# Patient Record
Sex: MALE | Race: WHITE | Employment: OTHER | URBAN - METROPOLITAN AREA
[De-identification: names, ages, dates, MRNs, and addresses within clinical notes are randomized per-mention and may not be internally consistent; named-entity substitution may affect disease eponyms.]

---

## 2019-10-31 ENCOUNTER — HOSPITAL ENCOUNTER (OUTPATIENT)
Dept: PREADMISSION TESTING | Age: 54
Discharge: HOME | End: 2019-10-31
Attending: ORTHOPAEDIC SURGERY
Payer: COMMERCIAL

## 2019-10-31 DIAGNOSIS — Z01.818 OTHER SPECIFIED PRE-OPERATIVE EXAMINATION: ICD-10-CM

## 2019-10-31 DIAGNOSIS — M43.09 SPONDYLOLYSIS, MULTIPLE SITES IN SPINE: ICD-10-CM

## 2019-10-31 DIAGNOSIS — M51.16 LUMBAR DISC PROLAPSE WITH ROOT COMPRESSION: ICD-10-CM

## 2019-10-31 DIAGNOSIS — Z01.812 BLOOD TESTS PRIOR TO TREATMENT OR PROCEDURE: ICD-10-CM

## 2019-10-31 LAB
ALBUMIN SERPL-MCNC: 4.3 G/DL (ref 3.4–5)
ALBUMIN/GLOB SERPL: 1.3 {RATIO} (ref 0.8–1.7)
ALP SERPL-CCNC: 87 U/L (ref 45–117)
ALT SERPL-CCNC: 38 U/L (ref 16–61)
ANION GAP SERPL CALC-SCNC: 5 MMOL/L (ref 3–18)
APTT PPP: 36 SEC (ref 23–36.4)
AST SERPL-CCNC: 28 U/L (ref 10–38)
ATRIAL RATE: 57 BPM
BACTERIA SPEC CULT: NORMAL
BASOPHILS # BLD: 0 K/UL (ref 0–0.1)
BASOPHILS NFR BLD: 1 % (ref 0–2)
BILIRUB SERPL-MCNC: 0.5 MG/DL (ref 0.2–1)
BUN SERPL-MCNC: 11 MG/DL (ref 7–18)
BUN/CREAT SERPL: 13 (ref 12–20)
CALCIUM SERPL-MCNC: 9.7 MG/DL (ref 8.5–10.1)
CALCULATED P AXIS, ECG09: 35 DEGREES
CALCULATED R AXIS, ECG10: 49 DEGREES
CALCULATED T AXIS, ECG11: 33 DEGREES
CHLORIDE SERPL-SCNC: 103 MMOL/L (ref 100–111)
CO2 SERPL-SCNC: 31 MMOL/L (ref 21–32)
CREAT SERPL-MCNC: 0.88 MG/DL (ref 0.6–1.3)
DIAGNOSIS, 93000: NORMAL
DIFFERENTIAL METHOD BLD: ABNORMAL
EOSINOPHIL # BLD: 0.4 K/UL (ref 0–0.4)
EOSINOPHIL NFR BLD: 6 % (ref 0–5)
ERYTHROCYTE [DISTWIDTH] IN BLOOD BY AUTOMATED COUNT: 13.7 % (ref 11.6–14.5)
EST. AVERAGE GLUCOSE BLD GHB EST-MCNC: 111 MG/DL
GLOBULIN SER CALC-MCNC: 3.3 G/DL (ref 2–4)
GLUCOSE SERPL-MCNC: 99 MG/DL (ref 74–99)
HBA1C MFR BLD: 5.5 % (ref 4.2–5.6)
HCT VFR BLD AUTO: 43 % (ref 36–48)
HGB BLD-MCNC: 14.6 G/DL (ref 13–16)
INR PPP: 1 (ref 0.8–1.2)
LYMPHOCYTES # BLD: 2.2 K/UL (ref 0.9–3.6)
LYMPHOCYTES NFR BLD: 29 % (ref 21–52)
MCH RBC QN AUTO: 32 PG (ref 24–34)
MCHC RBC AUTO-ENTMCNC: 34 G/DL (ref 31–37)
MCV RBC AUTO: 94.3 FL (ref 74–97)
MONOCYTES # BLD: 0.7 K/UL (ref 0.05–1.2)
MONOCYTES NFR BLD: 9 % (ref 3–10)
NEUTS SEG # BLD: 4.2 K/UL (ref 1.8–8)
NEUTS SEG NFR BLD: 55 % (ref 40–73)
P-R INTERVAL, ECG05: 136 MS
PLATELET # BLD AUTO: 246 K/UL (ref 135–420)
PMV BLD AUTO: 9.5 FL (ref 9.2–11.8)
POTASSIUM SERPL-SCNC: 4.9 MMOL/L (ref 3.5–5.5)
PROT SERPL-MCNC: 7.6 G/DL (ref 6.4–8.2)
PROTHROMBIN TIME: 12.7 SEC (ref 11.5–15.2)
Q-T INTERVAL, ECG07: 388 MS
QRS DURATION, ECG06: 98 MS
QTC CALCULATION (BEZET), ECG08: 377 MS
RBC # BLD AUTO: 4.56 M/UL (ref 4.7–5.5)
SERVICE CMNT-IMP: NORMAL
SODIUM SERPL-SCNC: 139 MMOL/L (ref 136–145)
VENTRICULAR RATE, ECG03: 57 BPM
WBC # BLD AUTO: 7.7 K/UL (ref 4.6–13.2)

## 2019-10-31 PROCEDURE — 86920 COMPATIBILITY TEST SPIN: CPT

## 2019-10-31 PROCEDURE — 87641 MR-STAPH DNA AMP PROBE: CPT

## 2019-10-31 PROCEDURE — 83036 HEMOGLOBIN GLYCOSYLATED A1C: CPT

## 2019-10-31 PROCEDURE — 85610 PROTHROMBIN TIME: CPT

## 2019-10-31 PROCEDURE — 85025 COMPLETE CBC W/AUTO DIFF WBC: CPT

## 2019-10-31 PROCEDURE — 86900 BLOOD TYPING SEROLOGIC ABO: CPT

## 2019-10-31 PROCEDURE — 85730 THROMBOPLASTIN TIME PARTIAL: CPT

## 2019-10-31 PROCEDURE — 80053 COMPREHEN METABOLIC PANEL: CPT

## 2019-10-31 PROCEDURE — 36415 COLL VENOUS BLD VENIPUNCTURE: CPT

## 2019-10-31 PROCEDURE — 93005 ELECTROCARDIOGRAM TRACING: CPT

## 2019-11-06 ENCOUNTER — ANESTHESIA EVENT (OUTPATIENT)
Dept: SURGERY | Age: 54
DRG: 454 | End: 2019-11-06
Payer: COMMERCIAL

## 2019-11-11 ENCOUNTER — HOSPITAL ENCOUNTER (INPATIENT)
Age: 54
LOS: 2 days | Discharge: HOME HEALTH | DRG: 454 | End: 2019-11-13
Attending: ORTHOPAEDIC SURGERY | Admitting: ORTHOPAEDIC SURGERY
Payer: COMMERCIAL

## 2019-11-11 ENCOUNTER — ANESTHESIA (OUTPATIENT)
Dept: SURGERY | Age: 54
DRG: 454 | End: 2019-11-11
Payer: COMMERCIAL

## 2019-11-11 ENCOUNTER — APPOINTMENT (OUTPATIENT)
Dept: GENERAL RADIOLOGY | Age: 54
DRG: 454 | End: 2019-11-11
Attending: INTERNAL MEDICINE
Payer: COMMERCIAL

## 2019-11-11 ENCOUNTER — APPOINTMENT (OUTPATIENT)
Dept: GENERAL RADIOLOGY | Age: 54
DRG: 454 | End: 2019-11-11
Attending: ORTHOPAEDIC SURGERY
Payer: COMMERCIAL

## 2019-11-11 DIAGNOSIS — D62 POSTOPERATIVE ANEMIA DUE TO ACUTE BLOOD LOSS: ICD-10-CM

## 2019-11-11 DIAGNOSIS — M51.36 DDD (DEGENERATIVE DISC DISEASE), LUMBAR: Primary | ICD-10-CM

## 2019-11-11 LAB
ANION GAP SERPL CALC-SCNC: 9 MMOL/L (ref 3–18)
BASOPHILS # BLD: 0 K/UL (ref 0–0.1)
BASOPHILS NFR BLD: 0 % (ref 0–3)
BLD PROD TYP BPU: NORMAL
BLD PROD TYP BPU: NORMAL
BPU ID: NORMAL
BPU ID: NORMAL
BUN SERPL-MCNC: 12 MG/DL (ref 7–18)
BUN/CREAT SERPL: 14 (ref 12–20)
CALCIUM SERPL-MCNC: 8 MG/DL (ref 8.5–10.1)
CALLED TO:,BCALL1: NORMAL
CHLORIDE SERPL-SCNC: 107 MMOL/L (ref 100–111)
CO2 SERPL-SCNC: 24 MMOL/L (ref 21–32)
CREAT SERPL-MCNC: 0.84 MG/DL (ref 0.6–1.3)
DIFFERENTIAL METHOD BLD: ABNORMAL
EOSINOPHIL # BLD: 0 K/UL (ref 0–0.4)
EOSINOPHIL NFR BLD: 0 % (ref 0–5)
ERYTHROCYTE [DISTWIDTH] IN BLOOD BY AUTOMATED COUNT: 13.6 % (ref 11.6–14.5)
GLUCOSE BLD STRIP.AUTO-MCNC: 158 MG/DL (ref 70–110)
GLUCOSE SERPL-MCNC: 180 MG/DL (ref 74–99)
HCT VFR BLD AUTO: 29.6 % (ref 36–48)
HGB BLD-MCNC: 10.1 G/DL (ref 13–16)
LYMPHOCYTES # BLD: 0.5 K/UL (ref 0.8–3.5)
LYMPHOCYTES NFR BLD: 3 % (ref 20–51)
MAGNESIUM SERPL-MCNC: 1.2 MG/DL (ref 1.6–2.6)
MCH RBC QN AUTO: 31.8 PG (ref 24–34)
MCHC RBC AUTO-ENTMCNC: 34.1 G/DL (ref 31–37)
MCV RBC AUTO: 93.1 FL (ref 74–97)
MONOCYTES # BLD: 1.2 K/UL (ref 0–1)
MONOCYTES NFR BLD: 8 % (ref 2–9)
NEUTS BAND NFR BLD MANUAL: 9 % (ref 0–5)
NEUTS SEG # BLD: 12 K/UL (ref 1.8–8)
NEUTS SEG NFR BLD: 80 % (ref 42–75)
PHOSPHATE SERPL-MCNC: 3.2 MG/DL (ref 2.5–4.9)
PLATELET # BLD AUTO: 187 K/UL (ref 135–420)
PMV BLD AUTO: 9.5 FL (ref 9.2–11.8)
POTASSIUM SERPL-SCNC: 3.9 MMOL/L (ref 3.5–5.5)
RBC # BLD AUTO: 3.18 M/UL (ref 4.7–5.5)
RBC MORPH BLD: ABNORMAL
SODIUM SERPL-SCNC: 140 MMOL/L (ref 136–145)
STATUS OF UNIT,%ST: NORMAL
STATUS OF UNIT,%ST: NORMAL
UNIT DIVISION, %UDIV: 0
UNIT DIVISION, %UDIV: 0
WBC # BLD AUTO: 15 K/UL (ref 4.6–13.2)

## 2019-11-11 PROCEDURE — C1713 ANCHOR/SCREW BN/BN,TIS/BN: HCPCS | Performed by: ORTHOPAEDIC SURGERY

## 2019-11-11 PROCEDURE — 77030020268 HC MISC GENERAL SUPPLY: Performed by: ORTHOPAEDIC SURGERY

## 2019-11-11 PROCEDURE — 77030031139 HC SUT VCRL2 J&J -A: Performed by: ORTHOPAEDIC SURGERY

## 2019-11-11 PROCEDURE — 74011000250 HC RX REV CODE- 250: Performed by: INTERNAL MEDICINE

## 2019-11-11 PROCEDURE — 77030040830 HC CATH URETH FOL MDII -A: Performed by: ORTHOPAEDIC SURGERY

## 2019-11-11 PROCEDURE — 77030010512 HC APPL CLP LIG J&J -C: Performed by: ORTHOPAEDIC SURGERY

## 2019-11-11 PROCEDURE — 77030020782 HC GWN BAIR PAWS FLX 3M -B: Performed by: ORTHOPAEDIC SURGERY

## 2019-11-11 PROCEDURE — 77030020010 HC FCPS BPLR DISP INLC -E: Performed by: ORTHOPAEDIC SURGERY

## 2019-11-11 PROCEDURE — P9045 ALBUMIN (HUMAN), 5%, 250 ML: HCPCS | Performed by: NURSE ANESTHETIST, CERTIFIED REGISTERED

## 2019-11-11 PROCEDURE — 77030020262 HC SOL INJ SOD CL 0.9% 100ML: Performed by: ORTHOPAEDIC SURGERY

## 2019-11-11 PROCEDURE — 0Q800ZZ DIVISION OF LUMBAR VERTEBRA, OPEN APPROACH: ICD-10-PCS | Performed by: ORTHOPAEDIC SURGERY

## 2019-11-11 PROCEDURE — 0ST40ZZ RESECTION OF LUMBOSACRAL DISC, OPEN APPROACH: ICD-10-PCS | Performed by: ORTHOPAEDIC SURGERY

## 2019-11-11 PROCEDURE — 74011250636 HC RX REV CODE- 250/636: Performed by: NURSE ANESTHETIST, CERTIFIED REGISTERED

## 2019-11-11 PROCEDURE — 77030018836 HC SOL IRR NACL ICUM -A: Performed by: ORTHOPAEDIC SURGERY

## 2019-11-11 PROCEDURE — 77030020788: Performed by: ORTHOPAEDIC SURGERY

## 2019-11-11 PROCEDURE — 80048 BASIC METABOLIC PNL TOTAL CA: CPT

## 2019-11-11 PROCEDURE — 0SG3071 FUSION OF LUMBOSACRAL JOINT WITH AUTOLOGOUS TISSUE SUBSTITUTE, POSTERIOR APPROACH, POSTERIOR COLUMN, OPEN APPROACH: ICD-10-PCS | Performed by: ORTHOPAEDIC SURGERY

## 2019-11-11 PROCEDURE — 82962 GLUCOSE BLOOD TEST: CPT

## 2019-11-11 PROCEDURE — 0SG30A0 FUSION OF LUMBOSACRAL JOINT WITH INTERBODY FUSION DEVICE, ANTERIOR APPROACH, ANTERIOR COLUMN, OPEN APPROACH: ICD-10-PCS | Performed by: ORTHOPAEDIC SURGERY

## 2019-11-11 PROCEDURE — 74011250636 HC RX REV CODE- 250/636: Performed by: ORTHOPAEDIC SURGERY

## 2019-11-11 PROCEDURE — 4A11X4G MONITORING OF PERIPHERAL NERVOUS ELECTRICAL ACTIVITY, INTRAOPERATIVE, EXTERNAL APPROACH: ICD-10-PCS | Performed by: ORTHOPAEDIC SURGERY

## 2019-11-11 PROCEDURE — 0SG1071 FUSION OF 2 OR MORE LUMBAR VERTEBRAL JOINTS WITH AUTOLOGOUS TISSUE SUBSTITUTE, POSTERIOR APPROACH, POSTERIOR COLUMN, OPEN APPROACH: ICD-10-PCS | Performed by: ORTHOPAEDIC SURGERY

## 2019-11-11 PROCEDURE — 74011250636 HC RX REV CODE- 250/636: Performed by: FAMILY MEDICINE

## 2019-11-11 PROCEDURE — 74019 RADEX ABDOMEN 2 VIEWS: CPT

## 2019-11-11 PROCEDURE — 74011250636 HC RX REV CODE- 250/636: Performed by: SPECIALIST

## 2019-11-11 PROCEDURE — 77030010784 HC BOWL MX BN MEDT -C: Performed by: ORTHOPAEDIC SURGERY

## 2019-11-11 PROCEDURE — 74011250636 HC RX REV CODE- 250/636

## 2019-11-11 PROCEDURE — 77030014007 HC SPNG HEMSTAT J&J -B: Performed by: ORTHOPAEDIC SURGERY

## 2019-11-11 PROCEDURE — 76060000055 HC ANESTHESIA 12 TO 12.5 HR: Performed by: ORTHOPAEDIC SURGERY

## 2019-11-11 PROCEDURE — 65610000006 HC RM INTENSIVE CARE

## 2019-11-11 PROCEDURE — 83735 ASSAY OF MAGNESIUM: CPT

## 2019-11-11 PROCEDURE — 0ST20ZZ RESECTION OF LUMBAR VERTEBRAL DISC, OPEN APPROACH: ICD-10-PCS | Performed by: ORTHOPAEDIC SURGERY

## 2019-11-11 PROCEDURE — 74011000250 HC RX REV CODE- 250: Performed by: ORTHOPAEDIC SURGERY

## 2019-11-11 PROCEDURE — 74011000258 HC RX REV CODE- 258: Performed by: NURSE ANESTHETIST, CERTIFIED REGISTERED

## 2019-11-11 PROCEDURE — 77030034479 HC ADH SKN CLSR PRINEO J&J -B: Performed by: ORTHOPAEDIC SURGERY

## 2019-11-11 PROCEDURE — 77030016661 HC BUR RND1 STRY -C: Performed by: ORTHOPAEDIC SURGERY

## 2019-11-11 PROCEDURE — 74011000250 HC RX REV CODE- 250: Performed by: NURSE ANESTHETIST, CERTIFIED REGISTERED

## 2019-11-11 PROCEDURE — 77030029397 HC SHTH SBS BPLR SEALR MEDT -F: Performed by: ORTHOPAEDIC SURGERY

## 2019-11-11 PROCEDURE — 77030002986 HC SUT PROL J&J -A: Performed by: ORTHOPAEDIC SURGERY

## 2019-11-11 PROCEDURE — 0SG00A0 FUSION OF LUMBAR VERTEBRAL JOINT WITH INTERBODY FUSION DEVICE, ANTERIOR APPROACH, ANTERIOR COLUMN, OPEN APPROACH: ICD-10-PCS | Performed by: ORTHOPAEDIC SURGERY

## 2019-11-11 PROCEDURE — 74011250637 HC RX REV CODE- 250/637: Performed by: SPECIALIST

## 2019-11-11 PROCEDURE — 77030026179: Performed by: ORTHOPAEDIC SURGERY

## 2019-11-11 PROCEDURE — 76010000193: Performed by: ORTHOPAEDIC SURGERY

## 2019-11-11 PROCEDURE — 77030034626 HC LIGASURE SM JAW SEAL OPN SURG COVD -E: Performed by: ORTHOPAEDIC SURGERY

## 2019-11-11 PROCEDURE — 77030026180: Performed by: ORTHOPAEDIC SURGERY

## 2019-11-11 PROCEDURE — 74011636637 HC RX REV CODE- 636/637: Performed by: SPECIALIST

## 2019-11-11 PROCEDURE — 71045 X-RAY EXAM CHEST 1 VIEW: CPT

## 2019-11-11 PROCEDURE — 84100 ASSAY OF PHOSPHORUS: CPT

## 2019-11-11 PROCEDURE — 77030010517 HC APPL SEAL FLOSEL BAXT -B: Performed by: ORTHOPAEDIC SURGERY

## 2019-11-11 PROCEDURE — 01NB0ZZ RELEASE LUMBAR NERVE, OPEN APPROACH: ICD-10-PCS | Performed by: ORTHOPAEDIC SURGERY

## 2019-11-11 PROCEDURE — 0QH304Z INSERTION OF INTERNAL FIXATION DEVICE INTO LEFT PELVIC BONE, OPEN APPROACH: ICD-10-PCS | Performed by: ORTHOPAEDIC SURGERY

## 2019-11-11 PROCEDURE — 85025 COMPLETE CBC W/AUTO DIFF WBC: CPT

## 2019-11-11 PROCEDURE — 77030040361 HC SLV COMPR DVT MDII -B: Performed by: ORTHOPAEDIC SURGERY

## 2019-11-11 PROCEDURE — 74011250636 HC RX REV CODE- 250/636: Performed by: INTERNAL MEDICINE

## 2019-11-11 PROCEDURE — 77030008462 HC STPLR SKN PROX J&J -A: Performed by: ORTHOPAEDIC SURGERY

## 2019-11-11 PROCEDURE — 77030034475 HC MISC IMPL SPN: Performed by: ORTHOPAEDIC SURGERY

## 2019-11-11 PROCEDURE — P9016 RBC LEUKOCYTES REDUCED: HCPCS

## 2019-11-11 PROCEDURE — 94002 VENT MGMT INPAT INIT DAY: CPT

## 2019-11-11 PROCEDURE — 77030022295 HC SEAL BPLR VSL DISP MEDT -F: Performed by: ORTHOPAEDIC SURGERY

## 2019-11-11 PROCEDURE — 0QH204Z INSERTION OF INTERNAL FIXATION DEVICE INTO RIGHT PELVIC BONE, OPEN APPROACH: ICD-10-PCS | Performed by: ORTHOPAEDIC SURGERY

## 2019-11-11 PROCEDURE — 77030004435 HC BUR RND STRY -C: Performed by: ORTHOPAEDIC SURGERY

## 2019-11-11 PROCEDURE — 77030018673: Performed by: ORTHOPAEDIC SURGERY

## 2019-11-11 DEVICE — IC GRAFT CHAMBER - 5 CC
Type: IMPLANTABLE DEVICE | Site: SPINE LUMBAR | Status: FUNCTIONAL
Brand: I/C GRAFT CHAMBER ®

## 2019-11-11 DEVICE — IMPLANTABLE DEVICE: Type: IMPLANTABLE DEVICE | Site: SPINE LUMBAR | Status: FUNCTIONAL

## 2019-11-11 DEVICE — SET SCR SPNL L25MM DIA5.5MM TI FOR 5.5MM ROD EXPEDIUM: Type: IMPLANTABLE DEVICE | Site: SPINE LUMBAR | Status: FUNCTIONAL

## 2019-11-11 DEVICE — IC GRAFT CHAMBER - 10 CC
Type: IMPLANTABLE DEVICE | Site: SPINE LUMBAR | Status: FUNCTIONAL
Brand: I/C GRAFT CHAMBER ®

## 2019-11-11 RX ORDER — ALLOPURINOL 300 MG/1
300 TABLET ORAL DAILY
Status: DISCONTINUED | OUTPATIENT
Start: 2019-11-12 | End: 2019-11-13 | Stop reason: HOSPADM

## 2019-11-11 RX ORDER — INSULIN LISPRO 100 [IU]/ML
INJECTION, SOLUTION INTRAVENOUS; SUBCUTANEOUS ONCE
Status: COMPLETED | OUTPATIENT
Start: 2019-11-11 | End: 2019-11-11

## 2019-11-11 RX ORDER — SODIUM CHLORIDE, SODIUM LACTATE, POTASSIUM CHLORIDE, CALCIUM CHLORIDE 600; 310; 30; 20 MG/100ML; MG/100ML; MG/100ML; MG/100ML
125 INJECTION, SOLUTION INTRAVENOUS CONTINUOUS
Status: DISCONTINUED | OUTPATIENT
Start: 2019-11-11 | End: 2019-11-11

## 2019-11-11 RX ORDER — SODIUM CHLORIDE, SODIUM LACTATE, POTASSIUM CHLORIDE, CALCIUM CHLORIDE 600; 310; 30; 20 MG/100ML; MG/100ML; MG/100ML; MG/100ML
INJECTION, SOLUTION INTRAVENOUS
Status: DISCONTINUED | OUTPATIENT
Start: 2019-11-11 | End: 2019-11-11 | Stop reason: HOSPADM

## 2019-11-11 RX ORDER — ACETAMINOPHEN 500 MG
1000 TABLET ORAL
Status: COMPLETED | OUTPATIENT
Start: 2019-11-11 | End: 2019-11-11

## 2019-11-11 RX ORDER — PANTOPRAZOLE SODIUM 40 MG/1
40 TABLET, DELAYED RELEASE ORAL DAILY
Status: DISCONTINUED | OUTPATIENT
Start: 2019-11-12 | End: 2019-11-13 | Stop reason: HOSPADM

## 2019-11-11 RX ORDER — EPHEDRINE SULFATE/0.9% NACL/PF 50 MG/5 ML
SYRINGE (ML) INTRAVENOUS AS NEEDED
Status: DISCONTINUED | OUTPATIENT
Start: 2019-11-11 | End: 2019-11-11 | Stop reason: HOSPADM

## 2019-11-11 RX ORDER — SUCCINYLCHOLINE CHLORIDE 100 MG/5ML
SYRINGE (ML) INTRAVENOUS AS NEEDED
Status: DISCONTINUED | OUTPATIENT
Start: 2019-11-11 | End: 2019-11-11 | Stop reason: HOSPADM

## 2019-11-11 RX ORDER — ROCURONIUM BROMIDE 10 MG/ML
INJECTION, SOLUTION INTRAVENOUS AS NEEDED
Status: DISCONTINUED | OUTPATIENT
Start: 2019-11-11 | End: 2019-11-11 | Stop reason: HOSPADM

## 2019-11-11 RX ORDER — IPRATROPIUM BROMIDE AND ALBUTEROL SULFATE 2.5; .5 MG/3ML; MG/3ML
3 SOLUTION RESPIRATORY (INHALATION)
Status: DISCONTINUED | OUTPATIENT
Start: 2019-11-11 | End: 2019-11-13 | Stop reason: HOSPADM

## 2019-11-11 RX ORDER — FENTANYL CITRATE 50 UG/ML
INJECTION, SOLUTION INTRAMUSCULAR; INTRAVENOUS AS NEEDED
Status: DISCONTINUED | OUTPATIENT
Start: 2019-11-11 | End: 2019-11-11 | Stop reason: HOSPADM

## 2019-11-11 RX ORDER — KETAMINE HYDROCHLORIDE 10 MG/ML
INJECTION, SOLUTION INTRAMUSCULAR; INTRAVENOUS AS NEEDED
Status: DISCONTINUED | OUTPATIENT
Start: 2019-11-11 | End: 2019-11-11 | Stop reason: HOSPADM

## 2019-11-11 RX ORDER — LANOLIN ALCOHOL/MO/W.PET/CERES
325 CREAM (GRAM) TOPICAL
Status: DISCONTINUED | OUTPATIENT
Start: 2019-11-12 | End: 2019-11-13 | Stop reason: HOSPADM

## 2019-11-11 RX ORDER — DEXAMETHASONE SODIUM PHOSPHATE 4 MG/ML
4 INJECTION, SOLUTION INTRA-ARTICULAR; INTRALESIONAL; INTRAMUSCULAR; INTRAVENOUS; SOFT TISSUE EVERY 8 HOURS
Status: DISCONTINUED | OUTPATIENT
Start: 2019-11-11 | End: 2019-11-13 | Stop reason: HOSPADM

## 2019-11-11 RX ORDER — ONDANSETRON 2 MG/ML
4 INJECTION INTRAMUSCULAR; INTRAVENOUS
Status: DISCONTINUED | OUTPATIENT
Start: 2019-11-11 | End: 2019-11-13 | Stop reason: HOSPADM

## 2019-11-11 RX ORDER — SODIUM CHLORIDE 9 MG/ML
250 INJECTION, SOLUTION INTRAVENOUS AS NEEDED
Status: DISCONTINUED | OUTPATIENT
Start: 2019-11-11 | End: 2019-11-13 | Stop reason: HOSPADM

## 2019-11-11 RX ORDER — PROPOFOL 10 MG/ML
INJECTION, EMULSION INTRAVENOUS
Status: DISCONTINUED | OUTPATIENT
Start: 2019-11-11 | End: 2019-11-11 | Stop reason: HOSPADM

## 2019-11-11 RX ORDER — CHLORHEXIDINE GLUCONATE 1.2 MG/ML
10 RINSE ORAL EVERY 12 HOURS
Status: DISCONTINUED | OUTPATIENT
Start: 2019-11-11 | End: 2019-11-12

## 2019-11-11 RX ORDER — ALBUMIN HUMAN 50 G/1000ML
SOLUTION INTRAVENOUS AS NEEDED
Status: DISCONTINUED | OUTPATIENT
Start: 2019-11-11 | End: 2019-11-11 | Stop reason: HOSPADM

## 2019-11-11 RX ORDER — METOPROLOL SUCCINATE 50 MG/1
50 TABLET, EXTENDED RELEASE ORAL DAILY
Status: DISCONTINUED | OUTPATIENT
Start: 2019-11-12 | End: 2019-11-13

## 2019-11-11 RX ORDER — CEFAZOLIN SODIUM/WATER 2 G/20 ML
2 SYRINGE (ML) INTRAVENOUS EVERY 8 HOURS
Status: DISPENSED | OUTPATIENT
Start: 2019-11-12 | End: 2019-11-12

## 2019-11-11 RX ORDER — PHENYLEPHRINE HCL IN 0.9% NACL 1 MG/10 ML
SYRINGE (ML) INTRAVENOUS AS NEEDED
Status: DISCONTINUED | OUTPATIENT
Start: 2019-11-11 | End: 2019-11-11 | Stop reason: HOSPADM

## 2019-11-11 RX ORDER — FENTANYL CITRATE 50 UG/ML
25 INJECTION, SOLUTION INTRAMUSCULAR; INTRAVENOUS AS NEEDED
Status: DISCONTINUED | OUTPATIENT
Start: 2019-11-11 | End: 2019-11-13 | Stop reason: HOSPADM

## 2019-11-11 RX ORDER — DIPHENHYDRAMINE HCL 25 MG
25 CAPSULE ORAL
Status: DISCONTINUED | OUTPATIENT
Start: 2019-11-11 | End: 2019-11-13 | Stop reason: HOSPADM

## 2019-11-11 RX ORDER — SODIUM CHLORIDE 0.9 % (FLUSH) 0.9 %
5-40 SYRINGE (ML) INJECTION EVERY 8 HOURS
Status: DISCONTINUED | OUTPATIENT
Start: 2019-11-11 | End: 2019-11-11

## 2019-11-11 RX ORDER — FENTANYL CITRATE 50 UG/ML
50-100 INJECTION, SOLUTION INTRAMUSCULAR; INTRAVENOUS
Status: DISCONTINUED | OUTPATIENT
Start: 2019-11-11 | End: 2019-11-13 | Stop reason: HOSPADM

## 2019-11-11 RX ORDER — PREGABALIN 50 MG/1
50 CAPSULE ORAL
Status: COMPLETED | OUTPATIENT
Start: 2019-11-11 | End: 2019-11-11

## 2019-11-11 RX ORDER — MIDAZOLAM HYDROCHLORIDE 1 MG/ML
INJECTION, SOLUTION INTRAMUSCULAR; INTRAVENOUS
Status: COMPLETED
Start: 2019-11-11 | End: 2019-11-11

## 2019-11-11 RX ORDER — MIDAZOLAM HYDROCHLORIDE 1 MG/ML
2 INJECTION, SOLUTION INTRAMUSCULAR; INTRAVENOUS ONCE
Status: COMPLETED | OUTPATIENT
Start: 2019-11-11 | End: 2019-11-11

## 2019-11-11 RX ORDER — HYDROMORPHONE HYDROCHLORIDE 2 MG/ML
INJECTION, SOLUTION INTRAMUSCULAR; INTRAVENOUS; SUBCUTANEOUS AS NEEDED
Status: DISCONTINUED | OUTPATIENT
Start: 2019-11-11 | End: 2019-11-11 | Stop reason: HOSPADM

## 2019-11-11 RX ORDER — SODIUM CHLORIDE 0.9 % (FLUSH) 0.9 %
5-40 SYRINGE (ML) INJECTION EVERY 8 HOURS
Status: DISCONTINUED | OUTPATIENT
Start: 2019-11-11 | End: 2019-11-13 | Stop reason: HOSPADM

## 2019-11-11 RX ORDER — VANCOMYCIN HYDROCHLORIDE 1 G/20ML
INJECTION, POWDER, LYOPHILIZED, FOR SOLUTION INTRAVENOUS AS NEEDED
Status: DISCONTINUED | OUTPATIENT
Start: 2019-11-11 | End: 2019-11-11 | Stop reason: HOSPADM

## 2019-11-11 RX ORDER — DEXAMETHASONE SODIUM PHOSPHATE 4 MG/ML
INJECTION, SOLUTION INTRA-ARTICULAR; INTRALESIONAL; INTRAMUSCULAR; INTRAVENOUS; SOFT TISSUE AS NEEDED
Status: DISCONTINUED | OUTPATIENT
Start: 2019-11-11 | End: 2019-11-11 | Stop reason: HOSPADM

## 2019-11-11 RX ORDER — NALOXONE HYDROCHLORIDE 0.4 MG/ML
0.4 INJECTION, SOLUTION INTRAMUSCULAR; INTRAVENOUS; SUBCUTANEOUS AS NEEDED
Status: DISCONTINUED | OUTPATIENT
Start: 2019-11-11 | End: 2019-11-13 | Stop reason: HOSPADM

## 2019-11-11 RX ORDER — SODIUM CHLORIDE 0.9 % (FLUSH) 0.9 %
5-40 SYRINGE (ML) INJECTION AS NEEDED
Status: DISCONTINUED | OUTPATIENT
Start: 2019-11-11 | End: 2019-11-13 | Stop reason: HOSPADM

## 2019-11-11 RX ORDER — MAGNESIUM SULFATE 100 %
4 CRYSTALS MISCELLANEOUS AS NEEDED
Status: DISCONTINUED | OUTPATIENT
Start: 2019-11-11 | End: 2019-11-13 | Stop reason: HOSPADM

## 2019-11-11 RX ORDER — NALOXONE HYDROCHLORIDE 0.4 MG/ML
0.2 INJECTION, SOLUTION INTRAMUSCULAR; INTRAVENOUS; SUBCUTANEOUS AS NEEDED
Status: DISCONTINUED | OUTPATIENT
Start: 2019-11-11 | End: 2019-11-13 | Stop reason: HOSPADM

## 2019-11-11 RX ORDER — FENTANYL CITRATE 50 UG/ML
25 INJECTION, SOLUTION INTRAMUSCULAR; INTRAVENOUS
Status: DISCONTINUED | OUTPATIENT
Start: 2019-11-11 | End: 2019-11-11

## 2019-11-11 RX ORDER — SODIUM CHLORIDE, SODIUM LACTATE, POTASSIUM CHLORIDE, CALCIUM CHLORIDE 600; 310; 30; 20 MG/100ML; MG/100ML; MG/100ML; MG/100ML
100 INJECTION, SOLUTION INTRAVENOUS CONTINUOUS
Status: DISCONTINUED | OUTPATIENT
Start: 2019-11-11 | End: 2019-11-13 | Stop reason: HOSPADM

## 2019-11-11 RX ORDER — GLYCOPYRROLATE 0.2 MG/ML
INJECTION INTRAMUSCULAR; INTRAVENOUS AS NEEDED
Status: DISCONTINUED | OUTPATIENT
Start: 2019-11-11 | End: 2019-11-11 | Stop reason: HOSPADM

## 2019-11-11 RX ORDER — ASPIRIN 325 MG
325 TABLET ORAL DAILY
Status: DISCONTINUED | OUTPATIENT
Start: 2019-11-12 | End: 2019-11-13 | Stop reason: HOSPADM

## 2019-11-11 RX ORDER — CYCLOBENZAPRINE HCL 10 MG
5 TABLET ORAL
Status: DISCONTINUED | OUTPATIENT
Start: 2019-11-11 | End: 2019-11-13 | Stop reason: HOSPADM

## 2019-11-11 RX ORDER — MAGNESIUM SULFATE HEPTAHYDRATE 40 MG/ML
2 INJECTION, SOLUTION INTRAVENOUS
Status: COMPLETED | OUTPATIENT
Start: 2019-11-12 | End: 2019-11-12

## 2019-11-11 RX ORDER — ASCORBIC ACID 250 MG
1000 TABLET ORAL DAILY
Status: DISCONTINUED | OUTPATIENT
Start: 2019-11-12 | End: 2019-11-13 | Stop reason: HOSPADM

## 2019-11-11 RX ORDER — DOCUSATE SODIUM 100 MG/1
100 CAPSULE, LIQUID FILLED ORAL 2 TIMES DAILY
Status: DISCONTINUED | OUTPATIENT
Start: 2019-11-11 | End: 2019-11-13 | Stop reason: HOSPADM

## 2019-11-11 RX ORDER — ATORVASTATIN CALCIUM 20 MG/1
80 TABLET, FILM COATED ORAL DAILY
Status: DISCONTINUED | OUTPATIENT
Start: 2019-11-12 | End: 2019-11-13 | Stop reason: HOSPADM

## 2019-11-11 RX ORDER — PROPOFOL 10 MG/ML
INJECTION, EMULSION INTRAVENOUS AS NEEDED
Status: DISCONTINUED | OUTPATIENT
Start: 2019-11-11 | End: 2019-11-11 | Stop reason: HOSPADM

## 2019-11-11 RX ORDER — OXYCODONE HYDROCHLORIDE 5 MG/1
5 TABLET ORAL EVERY 4 HOURS
Status: DISCONTINUED | OUTPATIENT
Start: 2019-11-11 | End: 2019-11-13 | Stop reason: HOSPADM

## 2019-11-11 RX ORDER — MIDAZOLAM HYDROCHLORIDE 1 MG/ML
INJECTION, SOLUTION INTRAMUSCULAR; INTRAVENOUS AS NEEDED
Status: DISCONTINUED | OUTPATIENT
Start: 2019-11-11 | End: 2019-11-11 | Stop reason: HOSPADM

## 2019-11-11 RX ORDER — ACETAMINOPHEN 325 MG/1
650 TABLET ORAL
Status: DISCONTINUED | OUTPATIENT
Start: 2019-11-11 | End: 2019-11-13 | Stop reason: HOSPADM

## 2019-11-11 RX ORDER — CEFAZOLIN SODIUM/WATER 2 G/20 ML
2 SYRINGE (ML) INTRAVENOUS ONCE
Status: COMPLETED | OUTPATIENT
Start: 2019-11-11 | End: 2019-11-11

## 2019-11-11 RX ORDER — DEXTROSE MONOHYDRATE 100 MG/ML
125-250 INJECTION, SOLUTION INTRAVENOUS AS NEEDED
Status: DISCONTINUED | OUTPATIENT
Start: 2019-11-11 | End: 2019-11-13 | Stop reason: HOSPADM

## 2019-11-11 RX ORDER — VENLAFAXINE HYDROCHLORIDE 75 MG/1
150 CAPSULE, EXTENDED RELEASE ORAL DAILY
Status: DISCONTINUED | OUTPATIENT
Start: 2019-11-12 | End: 2019-11-13 | Stop reason: HOSPADM

## 2019-11-11 RX ORDER — DEXAMETHASONE 4 MG/1
4 TABLET ORAL EVERY 8 HOURS
Status: DISPENSED | OUTPATIENT
Start: 2019-11-11 | End: 2019-11-12

## 2019-11-11 RX ORDER — SODIUM CHLORIDE 9 MG/ML
INJECTION, SOLUTION INTRAVENOUS
Status: DISCONTINUED | OUTPATIENT
Start: 2019-11-11 | End: 2019-11-11 | Stop reason: HOSPADM

## 2019-11-11 RX ORDER — MORPHINE SULFATE 2 MG/ML
2 INJECTION, SOLUTION INTRAMUSCULAR; INTRAVENOUS
Status: DISCONTINUED | OUTPATIENT
Start: 2019-11-11 | End: 2019-11-13 | Stop reason: HOSPADM

## 2019-11-11 RX ORDER — ONDANSETRON 2 MG/ML
INJECTION INTRAMUSCULAR; INTRAVENOUS AS NEEDED
Status: DISCONTINUED | OUTPATIENT
Start: 2019-11-11 | End: 2019-11-11 | Stop reason: HOSPADM

## 2019-11-11 RX ORDER — LIDOCAINE HYDROCHLORIDE 20 MG/ML
INJECTION, SOLUTION EPIDURAL; INFILTRATION; INTRACAUDAL; PERINEURAL AS NEEDED
Status: DISCONTINUED | OUTPATIENT
Start: 2019-11-11 | End: 2019-11-11 | Stop reason: HOSPADM

## 2019-11-11 RX ADMIN — ACETAMINOPHEN 1000 MG: 500 TABLET ORAL at 06:46

## 2019-11-11 RX ADMIN — Medication 10 MG: at 12:08

## 2019-11-11 RX ADMIN — Medication 10 MG: at 12:42

## 2019-11-11 RX ADMIN — Medication 2 G: at 23:21

## 2019-11-11 RX ADMIN — SODIUM CHLORIDE, SODIUM LACTATE, POTASSIUM CHLORIDE, AND CALCIUM CHLORIDE: 600; 310; 30; 20 INJECTION, SOLUTION INTRAVENOUS at 11:43

## 2019-11-11 RX ADMIN — Medication 100 MCG: at 14:57

## 2019-11-11 RX ADMIN — SODIUM CHLORIDE, SODIUM LACTATE, POTASSIUM CHLORIDE, AND CALCIUM CHLORIDE: 600; 310; 30; 20 INJECTION, SOLUTION INTRAVENOUS at 13:38

## 2019-11-11 RX ADMIN — Medication 2 G: at 08:32

## 2019-11-11 RX ADMIN — Medication 10 MG: at 12:14

## 2019-11-11 RX ADMIN — Medication 10 MG: at 13:51

## 2019-11-11 RX ADMIN — REMIFENTANIL HYDROCHLORIDE 0.05 MCG/KG/MIN: 1 INJECTION, POWDER, LYOPHILIZED, FOR SOLUTION INTRAVENOUS at 08:26

## 2019-11-11 RX ADMIN — PHENYLEPHRINE HYDROCHLORIDE 100 MCG/MIN: 10 INJECTION INTRAVENOUS at 17:16

## 2019-11-11 RX ADMIN — TRANEXAMIC ACID 1 G: 100 INJECTION, SOLUTION INTRAVENOUS at 08:19

## 2019-11-11 RX ADMIN — Medication 10 MG: at 08:20

## 2019-11-11 RX ADMIN — Medication 10 MG: at 13:56

## 2019-11-11 RX ADMIN — Medication 2 G: at 12:27

## 2019-11-11 RX ADMIN — KETAMINE HYDROCHLORIDE 10 MG: 10 INJECTION, SOLUTION INTRAMUSCULAR; INTRAVENOUS at 19:25

## 2019-11-11 RX ADMIN — FENTANYL CITRATE 100 MCG: 50 INJECTION, SOLUTION INTRAMUSCULAR; INTRAVENOUS at 07:55

## 2019-11-11 RX ADMIN — PROPOFOL 50 MG: 10 INJECTION, EMULSION INTRAVENOUS at 07:59

## 2019-11-11 RX ADMIN — Medication 100 MCG: at 14:27

## 2019-11-11 RX ADMIN — Medication 10 MG: at 12:23

## 2019-11-11 RX ADMIN — SODIUM CHLORIDE, SODIUM LACTATE, POTASSIUM CHLORIDE, AND CALCIUM CHLORIDE: 600; 310; 30; 20 INJECTION, SOLUTION INTRAVENOUS at 15:15

## 2019-11-11 RX ADMIN — PROPOFOL 150 MG: 10 INJECTION, EMULSION INTRAVENOUS at 07:58

## 2019-11-11 RX ADMIN — ONDANSETRON HYDROCHLORIDE 4 MG: 2 INJECTION INTRAMUSCULAR; INTRAVENOUS at 08:27

## 2019-11-11 RX ADMIN — MIDAZOLAM HYDROCHLORIDE 2 MG: 1 INJECTION, SOLUTION INTRAMUSCULAR; INTRAVENOUS at 21:08

## 2019-11-11 RX ADMIN — Medication 10 ML: at 21:49

## 2019-11-11 RX ADMIN — Medication 10 MG: at 07:58

## 2019-11-11 RX ADMIN — TRANEXAMIC ACID 1 G: 100 INJECTION, SOLUTION INTRAVENOUS at 16:18

## 2019-11-11 RX ADMIN — REMIFENTANIL HYDROCHLORIDE: 1 INJECTION, POWDER, LYOPHILIZED, FOR SOLUTION INTRAVENOUS at 16:23

## 2019-11-11 RX ADMIN — LIDOCAINE HYDROCHLORIDE 60 MG: 20 INJECTION, SOLUTION EPIDURAL; INFILTRATION; INTRACAUDAL; PERINEURAL at 07:58

## 2019-11-11 RX ADMIN — ALBUMIN (HUMAN) 250 ML: 12.5 INJECTION, SOLUTION INTRAVENOUS at 17:22

## 2019-11-11 RX ADMIN — Medication 10 MG: at 10:13

## 2019-11-11 RX ADMIN — Medication 10 MG: at 10:29

## 2019-11-11 RX ADMIN — KETAMINE HYDROCHLORIDE 10 MG: 10 INJECTION, SOLUTION INTRAMUSCULAR; INTRAVENOUS at 08:03

## 2019-11-11 RX ADMIN — Medication 100 MCG: at 13:56

## 2019-11-11 RX ADMIN — KETAMINE HYDROCHLORIDE 10 MG: 10 INJECTION, SOLUTION INTRAMUSCULAR; INTRAVENOUS at 12:49

## 2019-11-11 RX ADMIN — KETAMINE HYDROCHLORIDE 5 MG: 10 INJECTION, SOLUTION INTRAMUSCULAR; INTRAVENOUS at 18:44

## 2019-11-11 RX ADMIN — Medication 20 MG: at 09:10

## 2019-11-11 RX ADMIN — PROPOFOL: 10 INJECTION, EMULSION INTRAVENOUS at 16:23

## 2019-11-11 RX ADMIN — Medication 100 MCG: at 13:47

## 2019-11-11 RX ADMIN — ALBUMIN (HUMAN) 249 ML: 12.5 INJECTION, SOLUTION INTRAVENOUS at 12:44

## 2019-11-11 RX ADMIN — Medication 50 MCG/HR: at 22:08

## 2019-11-11 RX ADMIN — SODIUM CHLORIDE: 900 INJECTION, SOLUTION INTRAVENOUS at 17:20

## 2019-11-11 RX ADMIN — KETAMINE HYDROCHLORIDE 10 MG: 10 INJECTION, SOLUTION INTRAMUSCULAR; INTRAVENOUS at 19:13

## 2019-11-11 RX ADMIN — INSULIN LISPRO 3 UNITS: 100 INJECTION, SOLUTION INTRAVENOUS; SUBCUTANEOUS at 21:50

## 2019-11-11 RX ADMIN — Medication 10 MG: at 09:37

## 2019-11-11 RX ADMIN — Medication 100 MCG: at 14:48

## 2019-11-11 RX ADMIN — Medication 10 MG: at 10:34

## 2019-11-11 RX ADMIN — Medication 100 MCG: at 14:59

## 2019-11-11 RX ADMIN — SODIUM CHLORIDE, SODIUM LACTATE, POTASSIUM CHLORIDE, AND CALCIUM CHLORIDE 100 ML/HR: 600; 310; 30; 20 INJECTION, SOLUTION INTRAVENOUS at 21:39

## 2019-11-11 RX ADMIN — HYDROMORPHONE HYDROCHLORIDE 0.2 MG: 2 INJECTION, SOLUTION INTRAMUSCULAR; INTRAVENOUS; SUBCUTANEOUS at 19:16

## 2019-11-11 RX ADMIN — SODIUM CHLORIDE, SODIUM LACTATE, POTASSIUM CHLORIDE, AND CALCIUM CHLORIDE: 600; 310; 30; 20 INJECTION, SOLUTION INTRAVENOUS at 12:58

## 2019-11-11 RX ADMIN — MIDAZOLAM 1 MG: 1 INJECTION INTRAMUSCULAR; INTRAVENOUS at 12:51

## 2019-11-11 RX ADMIN — DEXAMETHASONE SODIUM PHOSPHATE 8 MG: 4 INJECTION, SOLUTION INTRAMUSCULAR; INTRAVENOUS at 07:53

## 2019-11-11 RX ADMIN — Medication 100 MCG: at 18:41

## 2019-11-11 RX ADMIN — MIDAZOLAM 1 MG: 1 INJECTION INTRAMUSCULAR; INTRAVENOUS at 14:23

## 2019-11-11 RX ADMIN — KETAMINE HYDROCHLORIDE 5 MG: 10 INJECTION, SOLUTION INTRAMUSCULAR; INTRAVENOUS at 18:27

## 2019-11-11 RX ADMIN — SODIUM CHLORIDE, SODIUM LACTATE, POTASSIUM CHLORIDE, AND CALCIUM CHLORIDE: 600; 310; 30; 20 INJECTION, SOLUTION INTRAVENOUS at 09:43

## 2019-11-11 RX ADMIN — Medication 5 MG: at 09:35

## 2019-11-11 RX ADMIN — Medication 100 MCG: at 19:35

## 2019-11-11 RX ADMIN — Medication 10 MG: at 12:05

## 2019-11-11 RX ADMIN — MIDAZOLAM 1 MG: 1 INJECTION INTRAMUSCULAR; INTRAVENOUS at 11:51

## 2019-11-11 RX ADMIN — CHLORHEXIDINE GLUCONATE 10 ML: 1.2 RINSE ORAL at 21:39

## 2019-11-11 RX ADMIN — MIDAZOLAM 2 MG: 1 INJECTION INTRAMUSCULAR; INTRAVENOUS at 07:50

## 2019-11-11 RX ADMIN — Medication 2 G: at 16:06

## 2019-11-11 RX ADMIN — Medication 10 MG: at 10:15

## 2019-11-11 RX ADMIN — REMIFENTANIL HYDROCHLORIDE 0.05 MCG: 1 INJECTION, POWDER, LYOPHILIZED, FOR SOLUTION INTRAVENOUS at 12:04

## 2019-11-11 RX ADMIN — SODIUM CHLORIDE, SODIUM LACTATE, POTASSIUM CHLORIDE, AND CALCIUM CHLORIDE: 600; 310; 30; 20 INJECTION, SOLUTION INTRAVENOUS at 08:11

## 2019-11-11 RX ADMIN — Medication 100 MG: at 07:59

## 2019-11-11 RX ADMIN — Medication 100 MCG: at 14:33

## 2019-11-11 RX ADMIN — PROPOFOL 100 MCG/KG/MIN: 10 INJECTION, EMULSION INTRAVENOUS at 08:36

## 2019-11-11 RX ADMIN — PREGABALIN 50 MG: 50 CAPSULE ORAL at 06:46

## 2019-11-11 RX ADMIN — MIDAZOLAM 1 MG: 1 INJECTION INTRAMUSCULAR; INTRAVENOUS at 15:05

## 2019-11-11 RX ADMIN — ALBUMIN (HUMAN) 1 ML: 12.5 INJECTION, SOLUTION INTRAVENOUS at 12:22

## 2019-11-11 RX ADMIN — MIDAZOLAM 1 MG: 1 INJECTION INTRAMUSCULAR; INTRAVENOUS at 15:30

## 2019-11-11 RX ADMIN — Medication 100 MCG: at 19:30

## 2019-11-11 RX ADMIN — SODIUM CHLORIDE, SODIUM LACTATE, POTASSIUM CHLORIDE, AND CALCIUM CHLORIDE 125 ML/HR: 600; 310; 30; 20 INJECTION, SOLUTION INTRAVENOUS at 06:26

## 2019-11-11 RX ADMIN — Medication 10 MG: at 10:48

## 2019-11-11 RX ADMIN — SODIUM CHLORIDE: 900 INJECTION, SOLUTION INTRAVENOUS at 16:02

## 2019-11-11 RX ADMIN — Medication 15 MG: at 09:39

## 2019-11-11 RX ADMIN — Medication 10 MG: at 13:52

## 2019-11-11 RX ADMIN — DEXAMETHASONE SODIUM PHOSPHATE 4 MG: 4 INJECTION, SOLUTION INTRAMUSCULAR; INTRAVENOUS at 21:40

## 2019-11-11 RX ADMIN — MIDAZOLAM 1 MG: 1 INJECTION INTRAMUSCULAR; INTRAVENOUS at 18:18

## 2019-11-11 RX ADMIN — Medication 10 MG: at 12:12

## 2019-11-11 RX ADMIN — Medication 10 MG: at 13:46

## 2019-11-11 RX ADMIN — MIDAZOLAM 1 MG: 1 INJECTION INTRAMUSCULAR; INTRAVENOUS at 17:22

## 2019-11-11 RX ADMIN — Medication 100 MCG: at 17:10

## 2019-11-11 RX ADMIN — Medication 10 MG: at 08:31

## 2019-11-11 RX ADMIN — Medication 5 MG: at 09:26

## 2019-11-11 RX ADMIN — PHENYLEPHRINE HYDROCHLORIDE 100 MCG: 10 INJECTION INTRAVENOUS at 17:20

## 2019-11-11 RX ADMIN — ONDANSETRON HYDROCHLORIDE 4 MG: 2 INJECTION INTRAMUSCULAR; INTRAVENOUS at 19:51

## 2019-11-11 RX ADMIN — MIDAZOLAM 1 MG: 1 INJECTION INTRAMUSCULAR; INTRAVENOUS at 10:43

## 2019-11-11 RX ADMIN — Medication 100 MCG: at 17:14

## 2019-11-11 RX ADMIN — MAGNESIUM SULFATE HEPTAHYDRATE 2 G: 40 INJECTION, SOLUTION INTRAVENOUS at 23:21

## 2019-11-11 RX ADMIN — MIDAZOLAM 1 MG: 1 INJECTION INTRAMUSCULAR; INTRAVENOUS at 08:07

## 2019-11-11 RX ADMIN — GLYCOPYRROLATE 0.2 MG: 0.2 INJECTION INTRAMUSCULAR; INTRAVENOUS at 07:50

## 2019-11-11 RX ADMIN — SUGAMMADEX 155 MG: 100 INJECTION, SOLUTION INTRAVENOUS at 09:48

## 2019-11-11 RX ADMIN — Medication 10 MG: at 13:49

## 2019-11-11 RX ADMIN — Medication 40 MG: at 08:10

## 2019-11-11 RX ADMIN — Medication 100 MCG: at 17:18

## 2019-11-11 RX ADMIN — Medication 5 MG: at 09:32

## 2019-11-11 RX ADMIN — Medication 100 MCG: at 14:38

## 2019-11-11 RX ADMIN — MIDAZOLAM HYDROCHLORIDE 2 MG/HR: 5 INJECTION, SOLUTION INTRAMUSCULAR; INTRAVENOUS at 21:28

## 2019-11-11 RX ADMIN — Medication 10 MG: at 12:38

## 2019-11-11 RX ADMIN — FENTANYL CITRATE 100 MCG: 50 INJECTION INTRAMUSCULAR; INTRAVENOUS at 21:37

## 2019-11-11 RX ADMIN — SODIUM CHLORIDE, SODIUM LACTATE, POTASSIUM CHLORIDE, AND CALCIUM CHLORIDE: 600; 310; 30; 20 INJECTION, SOLUTION INTRAVENOUS at 18:42

## 2019-11-11 NOTE — PROGRESS NOTES
History and Physical reviewed; I have examined the patient and there are no pertinent changes.     Burgess Nayana MD   7:23 AM 11/11/2019

## 2019-11-11 NOTE — ANESTHESIA PROCEDURE NOTES
Arterial Line Placement    Performed by: Areli Brown MD  Authorized by: Areli Brown MD     Pre-Procedure  Indications:  Arterial pressure monitoring and blood sampling  Preanesthetic Checklist: patient identified, site marked, patient being monitored and patient being monitored      Procedure:   Prep:  Chlorhexidine and alcohol  Seldinger Technique?: Yes    Orientation:  Left  Location:  Radial artery  Catheter size: 21ga needle, wire, 20 ga catheter (cook)  Number of attempts:  1  Cont Cardiac Output Sensor: No      Assessment:   Post-procedure:  Line secured and sterile dressing applied  Patient Tolerance:  Patient tolerated the procedure well with no immediate complications  Comment:   Done under GA. Ultrasound guidance to place - out of plane, then in  Plane for photo of artery.

## 2019-11-11 NOTE — ANESTHESIA PREPROCEDURE EVALUATION
Relevant Problems   No relevant active problems       Anesthetic History   No history of anesthetic complications            Review of Systems / Medical History  Patient summary reviewed, nursing notes reviewed and pertinent labs reviewed    Pulmonary  Within defined limits                 Neuro/Psych       CVA: no residual symptoms  TIA and psychiatric history     Cardiovascular    Hypertension: well controlled                   GI/Hepatic/Renal     GERD: well controlled           Endo/Other        Arthritis     Other Findings   Comments: gout           Physical Exam    Airway  Mallampati: I  TM Distance: 4 - 6 cm  Neck ROM: normal range of motion   Mouth opening: Normal     Cardiovascular    Rhythm: regular  Rate: normal         Dental    Dentition: Caps/crowns     Pulmonary  Breath sounds clear to auscultation               Abdominal  GI exam deferred       Other Findings            Anesthetic Plan    ASA: 2  Anesthesia type: general    Monitoring Plan: Arterial line      Induction: Intravenous  Anesthetic plan and risks discussed with: Family and Patient      R/B discussed including possible post op mechanical ventilation. A line explained.

## 2019-11-12 ENCOUNTER — APPOINTMENT (OUTPATIENT)
Dept: GENERAL RADIOLOGY | Age: 54
DRG: 454 | End: 2019-11-12
Attending: INTERNAL MEDICINE
Payer: COMMERCIAL

## 2019-11-12 PROBLEM — E83.42 HYPOMAGNESEMIA: Status: ACTIVE | Noted: 2019-11-12

## 2019-11-12 PROBLEM — K21.9 GERD (GASTROESOPHAGEAL REFLUX DISEASE): Status: ACTIVE | Noted: 2019-11-12

## 2019-11-12 PROBLEM — J96.00 ACUTE RESPIRATORY FAILURE (HCC): Status: ACTIVE | Noted: 2019-11-12

## 2019-11-12 PROBLEM — D62 POSTOPERATIVE ANEMIA DUE TO ACUTE BLOOD LOSS: Status: ACTIVE | Noted: 2019-11-12

## 2019-11-12 LAB
ANION GAP SERPL CALC-SCNC: 8 MMOL/L (ref 3–18)
ARTERIAL PATENCY WRIST A: NO
ARTERIAL PATENCY WRIST A: YES
BASE EXCESS BLD CALC-SCNC: 0 MMOL/L
BASE EXCESS BLD CALC-SCNC: 2 MMOL/L
BASOPHILS # BLD: 0 K/UL (ref 0–0.1)
BASOPHILS NFR BLD: 0 % (ref 0–2)
BDY SITE: ABNORMAL
BDY SITE: ABNORMAL
BODY TEMPERATURE: 97.6
BODY TEMPERATURE: 99
BUN SERPL-MCNC: 13 MG/DL (ref 7–18)
BUN/CREAT SERPL: 17 (ref 12–20)
CALCIUM SERPL-MCNC: 7.8 MG/DL (ref 8.5–10.1)
CHLORIDE SERPL-SCNC: 106 MMOL/L (ref 100–111)
CO2 SERPL-SCNC: 26 MMOL/L (ref 21–32)
CREAT SERPL-MCNC: 0.75 MG/DL (ref 0.6–1.3)
DIFFERENTIAL METHOD BLD: ABNORMAL
EOSINOPHIL # BLD: 0 K/UL (ref 0–0.4)
EOSINOPHIL NFR BLD: 0 % (ref 0–5)
ERYTHROCYTE [DISTWIDTH] IN BLOOD BY AUTOMATED COUNT: 13.7 % (ref 11.6–14.5)
GAS FLOW.O2 O2 DELIVERY SYS: ABNORMAL L/MIN
GAS FLOW.O2 O2 DELIVERY SYS: ABNORMAL L/MIN
GAS FLOW.O2 SETTING OXYMISER: 14 BPM
GAS FLOW.O2 SETTING OXYMISER: 14 BPM
GLUCOSE BLD STRIP.AUTO-MCNC: 111 MG/DL (ref 70–110)
GLUCOSE BLD STRIP.AUTO-MCNC: 139 MG/DL (ref 70–110)
GLUCOSE BLD STRIP.AUTO-MCNC: 145 MG/DL (ref 70–110)
GLUCOSE SERPL-MCNC: 128 MG/DL (ref 74–99)
HCO3 BLD-SCNC: 25.2 MMOL/L (ref 22–26)
HCO3 BLD-SCNC: 26.7 MMOL/L (ref 22–26)
HCT VFR BLD AUTO: 27.5 % (ref 36–48)
HGB BLD-MCNC: 9.4 G/DL (ref 13–16)
INSPIRATION.DURATION SETTING TIME VENT: 0.9 SEC
INSPIRATION.DURATION SETTING TIME VENT: 0.9 SEC
LYMPHOCYTES # BLD: 1 K/UL (ref 0.9–3.6)
LYMPHOCYTES NFR BLD: 8 % (ref 21–52)
MAGNESIUM SERPL-MCNC: 2 MG/DL (ref 1.6–2.6)
MCH RBC QN AUTO: 31.5 PG (ref 24–34)
MCHC RBC AUTO-ENTMCNC: 34.2 G/DL (ref 31–37)
MCV RBC AUTO: 92.3 FL (ref 74–97)
MONOCYTES # BLD: 1.8 K/UL (ref 0.05–1.2)
MONOCYTES NFR BLD: 14 % (ref 3–10)
NEUTS SEG # BLD: 10 K/UL (ref 1.8–8)
NEUTS SEG NFR BLD: 78 % (ref 40–73)
O2/TOTAL GAS SETTING VFR VENT: 35 %
O2/TOTAL GAS SETTING VFR VENT: 50 %
PCO2 BLD: 41 MMHG (ref 35–45)
PCO2 BLD: 41.3 MMHG (ref 35–45)
PEEP RESPIRATORY: 5 CMH2O
PEEP RESPIRATORY: 5 CMH2O
PH BLD: 7.39 [PH] (ref 7.35–7.45)
PH BLD: 7.42 [PH] (ref 7.35–7.45)
PHOSPHATE SERPL-MCNC: 3.9 MG/DL (ref 2.5–4.9)
PLATELET # BLD AUTO: 171 K/UL (ref 135–420)
PMV BLD AUTO: 9.6 FL (ref 9.2–11.8)
PO2 BLD: 118 MMHG (ref 80–100)
PO2 BLD: 214 MMHG (ref 80–100)
POTASSIUM SERPL-SCNC: 3.9 MMOL/L (ref 3.5–5.5)
RBC # BLD AUTO: 2.98 M/UL (ref 4.7–5.5)
SAO2 % BLD: 100 % (ref 92–97)
SAO2 % BLD: 99 % (ref 92–97)
SERVICE CMNT-IMP: ABNORMAL
SERVICE CMNT-IMP: ABNORMAL
SODIUM SERPL-SCNC: 140 MMOL/L (ref 136–145)
SPECIMEN TYPE: ABNORMAL
SPECIMEN TYPE: ABNORMAL
TOTAL RESP. RATE, ITRR: 14
TOTAL RESP. RATE, ITRR: 14
VENTILATION MODE VENT: ABNORMAL
VENTILATION MODE VENT: ABNORMAL
VOLUME CONTROL PLUS IVLCP: YES
VT SETTING VENT: 500 ML
VT SETTING VENT: 500 ML
WBC # BLD AUTO: 12.8 K/UL (ref 4.6–13.2)

## 2019-11-12 PROCEDURE — 82803 BLOOD GASES ANY COMBINATION: CPT

## 2019-11-12 PROCEDURE — 74011250636 HC RX REV CODE- 250/636: Performed by: FAMILY MEDICINE

## 2019-11-12 PROCEDURE — 65610000006 HC RM INTENSIVE CARE

## 2019-11-12 PROCEDURE — 71045 X-RAY EXAM CHEST 1 VIEW: CPT

## 2019-11-12 PROCEDURE — 82962 GLUCOSE BLOOD TEST: CPT

## 2019-11-12 PROCEDURE — 94003 VENT MGMT INPAT SUBQ DAY: CPT

## 2019-11-12 PROCEDURE — 74011250636 HC RX REV CODE- 250/636: Performed by: SPECIALIST

## 2019-11-12 PROCEDURE — 77010033678 HC OXYGEN DAILY

## 2019-11-12 PROCEDURE — 97530 THERAPEUTIC ACTIVITIES: CPT

## 2019-11-12 PROCEDURE — 85025 COMPLETE CBC W/AUTO DIFF WBC: CPT

## 2019-11-12 PROCEDURE — 97163 PT EVAL HIGH COMPLEX 45 MIN: CPT

## 2019-11-12 PROCEDURE — 74011250636 HC RX REV CODE- 250/636: Performed by: ORTHOPAEDIC SURGERY

## 2019-11-12 PROCEDURE — 74011250637 HC RX REV CODE- 250/637: Performed by: ORTHOPAEDIC SURGERY

## 2019-11-12 PROCEDURE — 36600 WITHDRAWAL OF ARTERIAL BLOOD: CPT

## 2019-11-12 PROCEDURE — 80048 BASIC METABOLIC PNL TOTAL CA: CPT

## 2019-11-12 PROCEDURE — 83735 ASSAY OF MAGNESIUM: CPT

## 2019-11-12 PROCEDURE — 77030027138 HC INCENT SPIROMETER -A

## 2019-11-12 PROCEDURE — 84100 ASSAY OF PHOSPHORUS: CPT

## 2019-11-12 PROCEDURE — 74011250636 HC RX REV CODE- 250/636: Performed by: INTERNAL MEDICINE

## 2019-11-12 RX ORDER — NOREPINEPHRINE BIT/0.9 % NACL 8 MG/250ML
INFUSION BOTTLE (ML) INTRAVENOUS
Status: DISPENSED
Start: 2019-11-12 | End: 2019-11-12

## 2019-11-12 RX ADMIN — DEXAMETHASONE SODIUM PHOSPHATE 4 MG: 4 INJECTION, SOLUTION INTRAMUSCULAR; INTRAVENOUS at 16:14

## 2019-11-12 RX ADMIN — CYCLOBENZAPRINE HYDROCHLORIDE 5 MG: 10 TABLET, FILM COATED ORAL at 16:14

## 2019-11-12 RX ADMIN — DEXAMETHASONE SODIUM PHOSPHATE 4 MG: 4 INJECTION, SOLUTION INTRAMUSCULAR; INTRAVENOUS at 21:28

## 2019-11-12 RX ADMIN — SODIUM CHLORIDE, SODIUM LACTATE, POTASSIUM CHLORIDE, AND CALCIUM CHLORIDE 100 ML/HR: 600; 310; 30; 20 INJECTION, SOLUTION INTRAVENOUS at 09:00

## 2019-11-12 RX ADMIN — DEXAMETHASONE SODIUM PHOSPHATE 4 MG: 4 INJECTION, SOLUTION INTRAMUSCULAR; INTRAVENOUS at 06:04

## 2019-11-12 RX ADMIN — SODIUM CHLORIDE, SODIUM LACTATE, POTASSIUM CHLORIDE, AND CALCIUM CHLORIDE 100 ML/HR: 600; 310; 30; 20 INJECTION, SOLUTION INTRAVENOUS at 20:03

## 2019-11-12 RX ADMIN — OXYCODONE HYDROCHLORIDE 5 MG: 5 TABLET ORAL at 16:14

## 2019-11-12 RX ADMIN — Medication 150 MCG/HR: at 04:21

## 2019-11-12 RX ADMIN — Medication 10 ML: at 21:29

## 2019-11-12 RX ADMIN — DOCUSATE SODIUM 100 MG: 100 CAPSULE, LIQUID FILLED ORAL at 19:14

## 2019-11-12 RX ADMIN — OXYCODONE HYDROCHLORIDE 5 MG: 5 TABLET ORAL at 19:14

## 2019-11-12 RX ADMIN — Medication 2 G: at 16:00

## 2019-11-12 RX ADMIN — MAGNESIUM SULFATE HEPTAHYDRATE 2 G: 40 INJECTION, SOLUTION INTRAVENOUS at 00:22

## 2019-11-12 RX ADMIN — DEXAMETHASONE SODIUM PHOSPHATE 4 MG: 4 INJECTION, SOLUTION INTRAMUSCULAR; INTRAVENOUS at 16:16

## 2019-11-12 NOTE — OP NOTES
Operative Report    Patient: De Luque MRN: [id number]  SSN: [id number]    YOB: 1965  Age: 62 y.o. Sex: male       Date of Surgery: 11/11/2019     Preoperative Diagnosis: INTERVERTEBRAL DISC DISORDERS WITH RADICULOPATHY, LUMBAR REGION, ACQUIRED DEFORMITIES OF SPINE     Postoperative Diagnosis: INTERVERTEBRAL DISC DISORDERS WITH RADICULOPATHY, LUMBAR REGION, ACQUIRED DEFORMITIES OF SPINE     Surgeon(s) and Role:     * Yvonne Shields MD - Primary     * Huan Amaya MD - Assisting    Anesthesia: General     Procedure: Procedure(s):  ANTERIOR LUMBAR INTERBODY FUSION LUMBAR 4-SACRAL 1, POSTERIOR LUMBAR 1- PELVIS LAMINECTOMY AND FUSION WITH LUMBAR 3 OSTEOTOMY WITH ALLOGRAFT, STRUCTURAL  AND MORSELIZED. WITH NEUROMONITORING WITH C-ARM     Procedure in Detail: The anterior portion of the case was performed first. After sterile preparation of the surgical field a timeout was conducted to ensure correct patient and location. Next Myself and dr. Fly Talbot began the approach via a virtival incision 3in in length located at the lateral edge of the rectus abdominal muscle in the left lower quadrant. Sharp and blunt dissection was then used to bring the dissection down to the lumbar spine. Caution was used around vital structures such as vena cava and aorta. Once all vessels were identified and protected the L5/S1 level was identified with the assistance of fluoro. A 15 blade was then used to begin discectomy which was then complete with pituitary kerosines, curets and endplate knives. Once full discectomy was complete at depuy 12mm high 14 degree ALIF device was placed and secured with 25mm screws. These same steps were conducted for the L4/5 level as well which also accepted a 12mm high 14 degree depuy implant and secured with 25mm screws. During retractor repositioning a small 2cm hole was created in the peritoneum. Bowel was seen but not injured. This was repaired with 3.0 proline.  After confirmation of correct positioning of the implants with fluoro the wound was irrigated with 1L NS and checked for leaks or bleeding . None were found and attention was paid to closure. Closure consisted of running 3.0 proline for fascia followed by 3.0 inverted vicryl and a running 4/0 monocryl for skin. [employer] was used over [employer]. Then 4x4 gauze and ioban was placed for dressing.  then left after the anterior portion of the case. Patient was then flipped from supine to prone. After sterile preparation of the surgical field a timeout was conducted to ensure correct surgery, site and patient. Following a 10 blade was used to make skin incision over the surgical side which was identified by skin anatomy. Bovie was then used to bring incision down to fascia. Once fascia was manually cleared, Bovie was then used again to bring exposure down the lamina of the vertebrae and out to the Transverse process. Identification of the spinal elements were conducted simultaneously. Following, using Kocher's the correct levels were confirmed using fluoroscopic guidance. Once confirmed pedicle screws were then placed from L1 to Pelvis. After confirming correct positioning via fluoroscopic guidance attention was turned to decompression. The Decompression extended from L1 to S1 and was accomplished by using paige and manual tools. Decompression was assessed using manual manipulation of the central dura and foramen to ensure wide decompression with any remaining stenosis at any level. Next the facets were taken down at the L2/3 level allowing for a posterior column osteotomy at that level. Following confirmation of the decompression/osteotomy. Next the rods were used as reduction devices to allows for improvement in the lumbar lordosis and scoliosis. After obtaining reduction a torch wrench was used to tighten all set screws into position. Following this a valsalva was performed and the dura was checked for leaks. Once the entire decompressed area was confirmed for no dural leak or active bleeding attention was turned to the fusion. The paige was used to decorticate the B/L facets and transverse process from L1 to Sacrum. Wound was then irrigated with 30ml of betadine in 1L of Ns. Following, all of the collected autograph from the decompression and additional cancellous chip allograph was mixed together in DBX putty. This was divided equally and placed into the lateral gutters B/L. 2g of vancomycin was then placed in wound followed by 12F Hemovac. Closure consisted of #1 vicryl  interrupted suture followed by another #1 vicryl running suture for the fascia. Following the subQ layer was closed with 2.0 vicryl interrupted. Finally skin was closed with 4.0 Monocryl running. After dressing consisted of prineo and mepalex dressing. The patient did well the entire case and I was present for all portions of the case. I was present for the entirety of the case and the patient did extremely well. Estimated Blood Loss:  1500cc    Tourniquet Time: * No tourniquets in log *      Implants:   Implant Name Type Inv.  Item Serial No.  Lot No. LRB No. Used Action   Prime DBM 5cc   [id number] [facility]  N/A 1 Implanted   Synfix evolution spacer 12mm height, 14 degree    SYNTHES SPINAL [id number] N/A 1 Implanted   synfix evolution fine tip screw 25mm    SYNTHES SPINAL [id number] N/A 4 Implanted   Prime HD DBM 10cc   [id number] [facility]  N/A 1 Implanted   synfix evolution spacer 12mm height, 14 degree    SYNTHES SPINAL [id number] N/A 1 Implanted   GRAFT BNE PART CHAMBERS 5ML --  - [id number]  GRAFT BNE PART CHAMBERS 5ML --  [id number] [facility] TISSUE [facility]  N/A 1 Implanted   GRAFT BNE PART CHAMBERS 10ML --  - [id number]  GRAFT BNE PART CHAMBERS 10ML --  [id number] [facility] TISSUE [facility]  N/A 1 Implanted   [facility] [id number] [employer]  N/A 1 Implanted   SCR SPNE DAVE FIX 5.5 6X50MM --  - [id number]  SCR SPNE DAVE FIX 5.5 6X50MM --   JN[employer] DEPUY SPINE [id number] N/A 8 Implanted   SCR SPNE DAVE FIX 5.5 7X50MM --  - [id number]  SCR SPNE DAVE FIX 5.5 7X50MM --   JNJ DEPUY SPINE [id number] N/A 4 Implanted   8 X 70 SCREW     DEPUY SPINE [id number] N/A 2 Implanted   SCR SET SPNE SGL 5.5 XPED TI --  - [id number]  SCR SET SPNE SGL 5.5 XPED TI --   [employer] DEPUY SPINE [id number] N/A 12 Implanted   LOREN SPNE HEX PC PB [id number] -- EXPEDIUM TI - [id number]  LOREN SPNE HEX PC PB [id number] -- EXPEDIUM TI  JN[employer] DEPUY SPINE [id number] N/A 1 Implanted   CONN XLINK SFX 5.5 TI MED A6 --  - [id number]  CONN XLINK SFX 5.5 TI MED A6 --   JNJ DEPUY SPINE [id number] N/A 1 Implanted               Specimens: * No specimens in log *        Drains: None                Complications: None    Counts: Sponge and needle counts were correct times two.     Signed By:  Yohannes Pino MD     November 11, 2019

## 2019-11-12 NOTE — PROGRESS NOTES
1855 Entered pt's chart to review, anticipating ICU admission/assignment. 1940 Completed verbal report, via telephone, with Saw Miranda RN. She stated she and anesthesia will bring pt and give more detailed report bedside. 2005 Pt arrived safely arrived on unit to ICU bed 8, initial henry ad VS recorded  2010 Bedside report completed with QIAN Simeon Seen, we discussed EBL (1,500ml), intake/fluids, output, and pt condition, reassessed henry and VS, she titrated dena to 25mcg/min  2015 Reassessed henry, VS, discussed pt care with Dr. Alejandro Cruz  2030 Completed full assessment of pt, discussed assessment, VS, and plan of care with Dr. Dominick Sims, intensivist. He advised he will put orders in for pt.  2040 Verbal order from Dr. Dominick Sims to titrate propofol to 25mcg/kg/min, completed   2050 Wife and Dr. Azra Posada at pt's bedside discussing pt's condition. 0015 Reassessed pt  0400 Reassessed pt  0800 Completed shift report and transferred care to FOREST Dolan RN. We reviewed SBAR, labs, meds, and plan of care for pt.

## 2019-11-12 NOTE — DIABETES MGMT
GLYCEMIC CONTROL PROGRESS NOTE:    - no known h/o DM  - Decadron 4 mg Q 8 hours, steroid-associated hyperglycemia  - BG in target range ICU: 140-180 mg/dL  - TDD = 3 units - Humalog Very Insulin Resistant Corrective Coverage  - recommend continue with - Humalog Normal Insulin Sensitivity Corrective Coverage while on steroid regimen    Recent Glucose Results:   Lab Results   Component Value Date/Time     (H) 11/12/2019 05:20 AM     (H) 11/11/2019 09:20 PM    GLUCPOC 111 (H) 11/12/2019 12:03 PM    GLUCPOC 139 (H) 11/12/2019 05:21 AM    GLUCPOC 145 (H) 11/12/2019 12:06 AM     Azalia Ornelas MS, RN, CDE  Glycemic Control Team  [phone]  Pager [phone] (M-TH 8:00-4:30P)  *After Hours pager [phone]

## 2019-11-12 NOTE — CONSULTS
[facility]  Pulmonary, Critical Care, and Sleep Medicine    Name: Zach Beckett MRN: [id number]   : 1965 Hospital: [facility] [place]   Date: 2019        Pulmonary Critical Care Note    IMPRESSION:   Patient Active Problem List   Diagnosis Code    Acute respiratory failure, post-op     Hypertension     Anxiety-depression disorder     CVA     Valencia's esophagus     GERD     DDD (degenerative disc disease), lumbar M51.36    Gout    INTERVERTEBRAL DISC DISORDERS WITH RADICULOPATHY, LUMBAR REGION, ACQUIRED DEFORMITIES OF SPINE, ANTERIOR LUMBAR INTERBODY FUSION LUMBAR 4-SACRAL 1, POSTERIOR LUMBAR 1- PELVIS LAMINECTOMY AND FUSION WITH LUMBAR 3 OSTEOTOMY WITH ALLOGRAFT, STRUCTURAL  AND MORSELIZED   RECOMMENDATIONS:   Mech. Ventilated patients- aim to keep peak plateau pressure less than or equal to 30cm H2O. Titrate FiO2 for goal SPO2> 91%  VAP prevention bundle, head of the bed at 30' all times  XR chest for ET tube placement. ABG and CXR every day  Continue bronchodilators PRN, pulmonary hygiene care  Daily sedation holiday and assessment for weaning with SBT as tolerated  Will change to Fentanyl and if needed Versed from Propofol to minimize effect on BP  Fluids:  mL/hr  Monitor hemodynamics. Patient required small dose [10 mcg] Doyle-synephrine in OR and now being titrated off of drip in ICU since MAP > 65 mm Hg. Actively titrate vasopressors aim MAP >65mmHg  Check labs including lytes  Glycemic control  AM labs. Diet NPO  A-Line, Merlos and Vent Bundles will be followed, Vent Day 1    Will defer respective systems problem management to primary and other consultant and follow patient in ICU with primary and other medical team  Further recommendations will be based on the patient's response to recommended treatment and results of the investigation ordered. Quality Care: PPI, DVT prophylaxis, HOB elevated, Infection control all reviewed and addressed.   PAIN AND SEDATION: Propofol, Fentanyl, Versed  · Skin/Wound: drain in place for surgical incision  · Nutrition: NPO  · Prophylaxis: DVT [SCD] and GI [PPI] Prophylaxis reviewed. · Restraints: for ventilator, line, ET tube safety  · PT/OT eval and treat: as needed and per surgery   · Lines/Tubes: lines PIV, coleman, ET tube  ADVANCE DIRECTIVE: Full code  FAMILY DISCUSSION: spoke with wife at bedside  Events and notes from last 24 hours reviewed. Care plan discussed with nursing      Subjective/History:   Mr. Alia Mcmanus has been seen and evaluated as Kaelyn gilliland now for assisting with ICU care. Patient is a 62 y.o. male who has PMhx for DDD lumber spine, HTN, HLD, CVA, gout came for an elective surgery and had anterior lumber interbody fusion, L4-S1, posterior L1-pelvis laminectomy and fusion with L3 osteotomy with allograft, structural and morselized. Surgery was significant for length > 9 hrs per anesthesiologist, EBL 1500 mL, PRBC tx x 2, IVF resuscitation. Surgery and anesthesia decided to leave patient on ventilator post-op for monitoring overnight. Wife at bedside in ICU. Patient has significant Fhx for back problems requiring surgeries. No recent hospitalizations. Other information is limited. Review of Systems:  Review of systems not obtained due to patient factors.     [x]The patient is unable to give any meaningful history or review of systems because the patient is:  [x]Intubated [x]Sedated   []Unresponsive []     [x]The patient is critically ill on      [x]Mechanical ventilation []Pressors   []BiPAP []                 Past Medical History:  Past Medical History:   Diagnosis Date    Arthritis     Valencia's esophagus     Chronic pain     intermittent lumbar x 5 years    Diverticulitis     GERD (gastroesophageal reflux disease)     Hypertension     Ill-defined condition     gout    Ill-defined condition     high cholesterol    Ill-defined condition     anxiety    Stroke ([facility] [address] 2014        Past Surgical History:  Past Surgical History:   Procedure Laterality Date    HX GI      colon resection, diverticulitis    HX HERNIA REPAIR      Incisional    HX HERNIA REPAIR  1968    Ingunial        Medications:  Prior to Admission medications    Medication Sig Start Date End Date Taking? Authorizing Provider   metoprolol succinate (TOPROL-XL) 50 mg XL tablet Take 50 mg by mouth daily. Yes Provider, Historical   ferrous sulfate (IRON) 325 mg (65 mg iron) tablet Take 65 mg by mouth Daily (before breakfast). Yes Provider, Historical   multivitamin (ONE A DAY) tablet Take 1 Tab by mouth daily. Yes Provider, Historical   ascorbic acid, vitamin C, (VITAMIN C) 500 mg tablet Take 1,000 mg by mouth daily. Yes Provider, Historical   aspirin (ASPIRIN) 325 mg tablet Take 325 mg by mouth daily. Yes Provider, Historical   venlafaxine-SR (EFFEXOR XR) 150 mg capsule Take 150 mg by mouth daily. Yes Provider, Historical   allopurinol (ZYLOPRIM) 300 mg tablet Take 300 mg by mouth daily. Yes Provider, Historical   atorvastatin (LIPITOR) 20 mg tablet Take 80 mg by mouth daily. Yes Provider, Historical   ALPRAZolam (XANAX) 0.5 mg tablet Take 0.5 mg by mouth two (2) times daily as needed. Indications: anxious   Yes Provider, Historical   omeprazole (PRILOSEC) 40 mg capsule Take 1 Cap by mouth daily. Take half an hour before first meal of the day  Indications: EROSIVE ESOPHAGITIS, Valencia's esophagitis  Patient taking differently: Take 40 mg by mouth two (2) times a day.  Take half an hour before first meal of the day  Indications: inflammation of the esophagus with erosion, Valencia's esophagitis 11/12/15  Yes Cam Camacho MD       Current Facility-Administered Medications   Medication Dose Route Frequency    lactated Ringers infusion  100 mL/hr IntraVENous CONTINUOUS    sodium chloride (NS) flush 5-40 mL  5-40 mL IntraVENous Q8H    insulin lispro (HUMALOG) injection   SubCUTAneous ONCE    lactated Ringers infusion  125 mL/hr IntraVENous CONTINUOUS    [START ON 11/12/2019] aspirin tablet 325 mg  325 mg Oral DAILY    [START ON 11/12/2019] venlafaxine-SR (EFFEXOR-XR) capsule 150 mg  150 mg Oral DAILY    [START ON 11/12/2019] allopurinol (ZYLOPRIM) tablet 300 mg  300 mg Oral DAILY    [START ON 11/12/2019] atorvastatin (LIPITOR) tablet 80 mg  80 mg Oral DAILY    [START ON 11/12/2019] metoprolol succinate (TOPROL-XL) XL tablet 50 mg  50 mg Oral DAILY    [START ON 11/12/2019] ferrous sulfate tablet 325 mg  325 mg Oral ACB    [START ON 11/12/2019] ascorbic acid (vitamin C) (VITAMIN C) tablet 1,000 mg  1,000 mg Oral DAILY    sodium chloride (NS) flush 5-40 mL  5-40 mL IntraVENous Q8H    oxyCODONE IR (ROXICODONE) tablet 5 mg  5 mg Oral Q4H    [START ON 11/12/2019] pantoprazole (PROTONIX) tablet 40 mg  40 mg Oral DAILY    docusate sodium (COLACE) capsule 100 mg  100 mg Oral BID    [START ON 11/12/2019] ceFAZolin (ANCEF) 2 g/20 mL in sterile water IV syringe  2 g IntraVENous Q8H    dexAMETHasone (DECADRON) tablet 4 mg  4 mg Oral Q8H    fentaNYL (PF) 900 mcg/30 ml infusion soln  0-200 mcg/hr IntraVENous TITRATE    midazolam in normal saline (VERSED) 2 mg/mL infusion  1-3 mg/hr IntraVENous TITRATE    chlorhexidine (PERIDEX) 0.12 % mouthwash 10 mL  10 mL Oral Q12H       Allergy:  Allergies   Allergen Reactions    Cat Dander Swelling     Swelling of eyes and blisters        Social History:  Social History     Tobacco Use    Smoking status: Never Smoker    Smokeless tobacco: Never Used   Substance Use Topics    Alcohol use: Not Currently     Comment: last etoh 05/2015    Drug use: Yes     Types: Marijuana     Comment: patient instructed to stop smoking x 24 hours prior to DOS        Family History:  History reviewed. No pertinent family history. Objective:   Vital Signs:    Blood pressure 115/75, pulse (!) 107, temperature 98.7 °F (37.1 °C), resp.  rate 22, height 5' 7\" (1.702 m), weight 76.3 kg (168 lb 4.8 oz), SpO2 99 %. Body mass index is 26.36 kg/m². O2 Device: Ventilator       Temp (24hrs), Av.4 °F (36.9 °C), Min:97 °F (36.1 °C), Max:98.8 °F (37.1 °C)         Intake/Output:   Last shift:      1901 -  07  In: 1000 [I.V.:1000]  Out: -   Last 3 shifts: 11/10 07 - 1900  In: 5152 [I.V.:6200]  Out: 2850 [Urine:1350]    Intake/Output Summary (Last 24 hours) at 2019  Last data filed at 2019  Gross per 24 hour   Intake 7750 ml   Output 2850 ml   Net 4900 ml       Ventilator Settings:  Mode Rate Tidal Volume Pressure FiO2 PEEP   Assist control, VC+   500 ml    50 % 5 cm H20     Peak airway pressure: 17 cm H2O    Minute ventilation: 9.33 l/min      Lung protective strategy, Pl pressure goals less than or equal to 30. Physical Exam:  General: sedated, in no respiratory distress and acyanotic, on ventilator, cooperative, appears stated age  [de-identified]: PERRL, fundi benign, throat normal without erythema or exudate  Neck: No abnormally enlarged lymph nodes or thyroid, supple  Chest: normal  Lungs: normal air entry, breathing normal , clear to auscultation bilaterally, normal percussion bilaterally, no tenderness/ rash  Heart: Regular rate and rhythm, S1S2 present or without murmur or extra heart sounds  Abdomen: non distended, bowel sounds normoactive, tympanic, abdomen is soft without significant tenderness, masses, organomegaly or guarding, rigidity, rebound; anterior incision under dressing and no bleeding; drain in place from posterior incision  Extremity: negative for edema, cyanosis, clubbing  Capillary refill: normal  Neuro: sedated, no involuntary movements, on ventilator, exam limitations  Skin: Skin color, texture, turgor fair.  Skin dry, warm, non-diaphoretic    Data:     Recent Results (from the past 24 hour(s))   PLATELETS, ALLOCATE    Collection Time: 19  7:15 AM   Result Value Ref Range    CALLED TO: JENY AT 0915 ON 19 [facility]     Unit number [id number] Blood component type PLTPH LR,2     Unit division 00     Status of unit REL FROM [facility]     Unit number [id number]     Blood component type PLTPH LR,2     Unit division 00     Status of unit REL FROM [facility]            No results for input(s): FIO2I, IFO2, HCO3I, IHCO3, HCOPOC, PCO2I, PCOPOC, IPHI, PHI, PHPOC, PO2I, PO2POC in the last 72 hours. No lab exists for component: IPOC2    All Micro Results     None          Telemetry: normal sinus rhythm      [x]See my orders for details    My assessment, plan of care, findings, medications, side effects etc were discussed with:  [x]nursing []PT/OT    [x]respiratory therapy [x]Dr. Ciera Newton [x]Patient     [x]Total critical care time exclusive of procedures 50 minutes with complex decision making performed and > 50% time spent in face to face evaluation. Shanique Cervantes MD     Addendum 9:30 pm  Patient has been off of vasopressor since 9-9:15 pm per RN. CXR reviewed at bedside and ET tube in proper position.     Shanique Cervantes MD

## 2019-11-12 NOTE — PROGRESS NOTES
Occupational Therapy Evaluation/Treatment Attempt    Chart reviewed. Attempted Occupational Therapy Evaluation/Treatment, however, patient unable to be seen due to:  []  Nausea/vomiting  []  Eating  []  Pain  []  Patient too lethargic  []  Off Unit for testing/procedure  []  Dialysis treatment in progress   []  Telemetry Results  [x]  Other: Patient sedated, intubated. Will hold to see if patient will be extubated this date & when medically appropriate for OT. Will follow up later as patient's schedule allows.    Thank you for this referral.  Jackeline Bunn, OTR/L, CSRS

## 2019-11-12 NOTE — PROGRESS NOTES
[facility]  Pulmonary, Critical Care, and Sleep Medicine    Name: Chris Jacobson MRN: [id number]   : 1965 Hospital: [facility] [place]   Date: 2019        Pulmonary Critical Care Note    IMPRESSION:   Patient Active Problem List   Diagnosis Code    Acute respiratory failure, post-op J96.00    Anemia from blood loss D64.9    Hypotension, 2' to # 2, improved I95.9    Anxiety-depression disorder F41.9, F32.9    CVA Z86.73    Valencia's esophagus K22.70    GERD     DDD (degenerative disc disease), lumbar M51.36    Gout    INTERVERTEBRAL DISC DISORDERS WITH RADICULOPATHY, LUMBAR REGION, ACQUIRED DEFORMITIES OF SPINE, ANTERIOR LUMBAR INTERBODY FUSION LUMBAR 4-SACRAL 1, POSTERIOR LUMBAR 1- PELVIS LAMINECTOMY AND FUSION WITH LUMBAR 3 OSTEOTOMY WITH ALLOGRAFT, STRUCTURAL  AND MORSELIZED   RECOMMENDATIONS:   Mech. Ventilated patients- aim to keep peak plateau pressure less than or equal to 30cm H2O. Titrate FiO2 for goal SPO2> 91%  VAP prevention bundle, head of the bed at 30' all times  Doing well on sedation holiday with bedside SBT. Awake, following all commands  Continue bronchodilators PRN, pulmonary hygiene care  Daily sedation holiday and assessment for weaning with SBT as tolerated- monitor for ventilator liberation following SBT  Fluids:  mL/hr  Monitor hemodynamics- stable. Patient not on Doyle-synephrine overnight and this AM; monitor for MAP > 65mmHg  Glycemic control  AM labs. Diet NPO  A-Line, Merlos and Vent Bundles will be followed, Vent Day 1    Will defer respective systems problem management to primary and other consultant and follow patient in ICU with primary and other medical team  Further recommendations will be based on the patient's response to recommended treatment and results of the investigation ordered. Quality Care: PPI, DVT prophylaxis, HOB elevated, Infection control all reviewed and addressed.   PAIN AND SEDATION: Fentanyl, Versed  · Skin/Wound: drain in place for surgical incision, manage per surgery  · Nutrition: NPO  · Prophylaxis: DVT [SCD] and GI [PPI] Prophylaxis reviewed. · Restraints: for ventilator, line, ET tube safety  · PT/OT eval and treat: as needed and per surgery   · Lines/Tubes: lines PIV, coleman, ET tube  ADVANCE DIRECTIVE: Full code  DISCUSSION: spoke with patient off sedation at bedside  Events and notes from last 24 hours reviewed. Care plan discussed with nursing      Subjective/History:   Mr. Bhupinder Friedman has been seen and evaluated as Kaelyn Cruz requested now for assisting with ICU care. Patient is a 62 y.o. male who has PMhx for DDD lumber spine, HTN, HLD, CVA, gout came for an elective surgery and had anterior lumber interbody fusion, L4-S1, posterior L1-pelvis laminectomy and fusion with L3 osteotomy with allograft, structural and morselized. Surgery was significant for length > 9 hrs per anesthesiologist, EBL 1500 mL, PRBC tx x 2, IVF resuscitation. Surgery and anesthesia decided to leave patient on ventilator post-op for monitoring overnight. Wife at bedside in ICU. Patient has significant Fhx for back problems requiring surgeries. No recent hospitalizations. Other information is limited. 11/12/19  Patient remained on ventilator and sedated  This am off sedation doing well- following all commands appropriately  Minimal to no ET secretions, good cough and gag reflex  Hemodynamically stable and not on any vasopressors since around 9 pm last night  Incision drain with some old blood; no active bleeding anywhere  Afebrile. Good UO. NPO      Review of Systems:  Review of systems not obtained due to patient factors.     [x]The patient is unable to give any meaningful history or review of systems because the patient is:  [x]Intubated [x]Sedated   []Unresponsive []     [x]The patient is critically ill on      [x]Mechanical ventilation []Pressors   []BiPAP []                 Past Medical History:  Past Medical History:   Diagnosis Date    Arthritis     Valencia's esophagus     Chronic pain     intermittent lumbar x 5 years    Diverticulitis     GERD (gastroesophageal reflux disease)     Hypertension     Ill-defined condition     gout    Ill-defined condition     high cholesterol    Ill-defined condition     anxiety    Stroke ([facility] [address] 2014        Past Surgical History:  Past Surgical History:   Procedure Laterality Date    HX GI      colon resection, diverticulitis    HX HERNIA REPAIR      Incisional    HX HERNIA REPAIR  1968    Ingunial        Medications:  Prior to Admission medications    Medication Sig Start Date End Date Taking? Authorizing Provider   metoprolol succinate (TOPROL-XL) 50 mg XL tablet Take 50 mg by mouth daily. Yes Provider, Historical   ferrous sulfate (IRON) 325 mg (65 mg iron) tablet Take 65 mg by mouth Daily (before breakfast). Yes Provider, Historical   multivitamin (ONE A DAY) tablet Take 1 Tab by mouth daily. Yes Provider, Historical   ascorbic acid, vitamin C, (VITAMIN C) 500 mg tablet Take 1,000 mg by mouth daily. Yes Provider, Historical   aspirin (ASPIRIN) 325 mg tablet Take 325 mg by mouth daily. Yes Provider, Historical   venlafaxine-SR (EFFEXOR XR) 150 mg capsule Take 150 mg by mouth daily. Yes Provider, Historical   allopurinol (ZYLOPRIM) 300 mg tablet Take 300 mg by mouth daily. Yes Provider, Historical   atorvastatin (LIPITOR) 20 mg tablet Take 80 mg by mouth daily. Yes Provider, Historical   ALPRAZolam (XANAX) 0.5 mg tablet Take 0.5 mg by mouth two (2) times daily as needed. Indications: anxious   Yes Provider, Historical   omeprazole (PRILOSEC) 40 mg capsule Take 1 Cap by mouth daily. Take half an hour before first meal of the day  Indications: EROSIVE ESOPHAGITIS, Valencia's esophagitis  Patient taking differently: Take 40 mg by mouth two (2) times a day.  Take half an hour before first meal of the day  Indications: inflammation of the esophagus with erosion, Valencia's esophagitis 11/12/15  Yes Vicenta Camacho MD       Current Facility-Administered Medications   Medication Dose Route Frequency    Norepinephrine Bitartrate (LEVOPHED) 8 mg/250 mL (32 mcg/mL) in NS 0.9% 250 ml infusion        lactated Ringers infusion  100 mL/hr IntraVENous CONTINUOUS    sodium chloride (NS) flush 5-40 mL  5-40 mL IntraVENous Q8H    aspirin tablet 325 mg  325 mg Oral DAILY    venlafaxine-SR (EFFEXOR-XR) capsule 150 mg  150 mg Oral DAILY    allopurinol (ZYLOPRIM) tablet 300 mg  300 mg Oral DAILY    atorvastatin (LIPITOR) tablet 80 mg  80 mg Oral DAILY    [Held by provider] metoprolol succinate (TOPROL-XL) XL tablet 50 mg  50 mg Oral DAILY    ferrous sulfate tablet 325 mg  325 mg Oral ACB    ascorbic acid (vitamin C) (VITAMIN C) tablet 1,000 mg  1,000 mg Oral DAILY    oxyCODONE IR (ROXICODONE) tablet 5 mg  5 mg Oral Q4H    pantoprazole (PROTONIX) tablet 40 mg  40 mg Oral DAILY    docusate sodium (COLACE) capsule 100 mg  100 mg Oral BID    ceFAZolin (ANCEF) 2 g/20 mL in sterile water IV syringe  2 g IntraVENous Q8H    dexAMETHasone (DECADRON) tablet 4 mg  4 mg Oral Q8H    fentaNYL (PF) 900 mcg/30 ml infusion soln  0-200 mcg/hr IntraVENous TITRATE    midazolam in normal saline (VERSED) 2 mg/mL infusion  1-3 mg/hr IntraVENous TITRATE    chlorhexidine (PERIDEX) 0.12 % mouthwash 10 mL  10 mL Oral Q12H    dexamethasone (DECADRON) 4 mg/mL injection 4 mg  4 mg IntraVENous Q8H       Allergy:  Allergies   Allergen Reactions    Cat Dander Swelling     Swelling of eyes and blisters        Social History:  Social History     Tobacco Use    Smoking status: Never Smoker    Smokeless tobacco: Never Used   Substance Use Topics    Alcohol use: Not Currently     Comment: last etoh 05/2015    Drug use: Yes     Types: Marijuana     Comment: patient instructed to stop smoking x 24 hours prior to DOS        Family History:  History reviewed. No pertinent family history.        Objective:     Vital Signs: Blood pressure 118/72, pulse 98, temperature 97.9 °F (36.6 °C), resp. rate 14, height 5' 7\" (1.702 m), weight 76.3 kg (168 lb 4.8 oz), SpO2 99 %. Body mass index is 26.36 kg/m². O2 Device: Room air       Temp (24hrs), Av.3 °F (36.8 °C), Min:97.4 °F (36.3 °C), Max:98.8 °F (37.1 °C)       Intake/Output:   Last shift:      No intake/output data recorded. Last 3 shifts: 11/10 1901 -  0700  In: 8595.3 [I.V.:8045.3]  Out: 7856 [Urine:2275; Drains:350]    Intake/Output Summary (Last 24 hours) at 2019 0825  Last data filed at 2019 0600  Gross per 24 hour   Intake 8095.31 ml   Output 4125 ml   Net 3970.31 ml        Ventilator Settings:  Mode Rate Tidal Volume Pressure FiO2 PEEP   Assist control, VC+   500 ml    28 % 5 cm H20     Peak airway pressure: 15 cm H2O    Minute ventilation: 10.6 l/min      Lung protective strategy, Pl pressure goals less than or equal to 30. Physical Exam:  General: awake, following commands, in no respiratory distress, on ventilator, cooperative, appears stated age  [de-identified]: PERRLA, throat normal without erythema or exudate  Neck: No abnormally enlarged lymph nodes or thyroid, supple  Chest: normal  Lungs: moderate air entry, clear to auscultation bilaterally, normal percussion bilaterally, no tenderness/ rash  Heart: Regular rate and rhythm, S1S2 present or without murmur or extra heart sounds  Abdomen: non distended, bowel sounds normoactive, tympanic, soft without significant tenderness, masses, organomegaly or guarding, rigidity, rebound; anterior incision under dressing and no bleeding; drain in place from posterior incision  Extremity: negative for edema, cyanosis, clubbing  Capillary refill: normal  Neuro: awake, moving LE and UE spontaneously, no involuntary movements, on ventilator, exam limitations  Skin: Skin color, texture, turgor fair.  Skin dry, warm, non-diaphoretic    Data:     Recent Results (from the past 24 hour(s))   GLUCOSE, POC    Collection Time: 11/11/19  9:10 PM   Result Value Ref Range    Glucose (POC) 158 (H) 70 - 002 mg/dL   METABOLIC PANEL, BASIC    Collection Time: 11/11/19  9:20 PM   Result Value Ref Range    Sodium 140 136 - 145 mmol/L    Potassium 3.9 3.5 - 5.5 mmol/L    Chloride 107 100 - 111 mmol/L    CO2 24 21 - 32 mmol/L    Anion gap 9 3.0 - 18 mmol/L    Glucose 180 (H) 74 - 99 mg/dL    BUN 12 7.0 - 18 MG/DL    Creatinine 0.84 0.6 - 1.3 MG/DL    BUN/Creatinine ratio 14 12 - 20      GFR est AA >60 >60 ml/min/1.73m2    GFR est non-AA >60 >60 ml/min/1.73m2    Calcium 8.0 (L) 8.5 - 10.1 MG/DL   MAGNESIUM    Collection Time: 11/11/19  9:20 PM   Result Value Ref Range    Magnesium 1.2 (L) 1.6 - 2.6 mg/dL   PHOSPHORUS    Collection Time: 11/11/19  9:20 PM   Result Value Ref Range    Phosphorus 3.2 2.5 - 4.9 MG/DL   CBC WITH AUTOMATED DIFF    Collection Time: 11/11/19  9:40 PM   Result Value Ref Range    WBC 15.0 (H) 4.6 - 13.2 K/uL    RBC 3.18 (L) 4.70 - 5.50 M/uL    HGB 10.1 (L) 13.0 - 16.0 g/dL    HCT 29.6 (L) 36.0 - 48.0 %    MCV 93.1 74.0 - 97.0 FL    MCH 31.8 24.0 - 34.0 PG    MCHC 34.1 31.0 - 37.0 g/dL    RDW 13.6 11.6 - 14.5 %    PLATELET 193 834 - 166 K/uL    MPV 9.5 9.2 - 11.8 FL    NEUTROPHILS 80 (H) 42 - 75 %    BAND NEUTROPHILS 9 (H) 0 - 5 %    LYMPHOCYTES 3 (L) 20 - 51 %    MONOCYTES 8 2 - 9 %    EOSINOPHILS 0 0 - 5 %    BASOPHILS 0 0 - 3 %    ABS. NEUTROPHILS 12.0 (H) 1.8 - 8.0 K/UL    ABS. LYMPHOCYTES 0.5 (L) 0.8 - 3.5 K/UL    ABS. MONOCYTES 1.2 (H) 0 - 1.0 K/UL    ABS. EOSINOPHILS 0.0 0.0 - 0.4 K/UL    ABS.  BASOPHILS 0.0 0.0 - 0.1 K/UL    RBC COMMENTS NORMOCYTIC, NORMOCHROMIC      DF MANUAL     GLUCOSE, POC    Collection Time: 11/12/19 12:06 AM   Result Value Ref Range    Glucose (POC) 145 (H) 70 - 110 mg/dL   POC G3    Collection Time: 11/12/19 12:22 AM   Result Value Ref Range    Device: VENT      FIO2 (POC) 50 %    pH (POC) 7.395 7.35 - 7.45      pCO2 (POC) 41.3 35.0 - 45.0 MMHG    pO2 (POC) 214 (H) 80 - 100 MMHG    HCO3 (POC) 25.2 22 - 26 MMOL/L    sO2 (POC) 100 (H) 92 - 97 %    Base excess (POC) 0 mmol/L    Mode ASSIST CONTROL      Tidal volume 500 ml    Set Rate 14 bpm    PEEP/CPAP (POC) 5 cmH2O    Allens test (POC) NO      Inspiratory Time 0.9 sec    Total resp. rate 14      Site DRAWN FROM ARTERIAL LINE      Patient temp. 99.0      Specimen type (POC) ARTERIAL      Performed by Josephine Jimenez    METABOLIC PANEL, BASIC    Collection Time: 11/12/19  5:20 AM   Result Value Ref Range    Sodium 140 136 - 145 mmol/L    Potassium 3.9 3.5 - 5.5 mmol/L    Chloride 106 100 - 111 mmol/L    CO2 26 21 - 32 mmol/L    Anion gap 8 3.0 - 18 mmol/L    Glucose 128 (H) 74 - 99 mg/dL    BUN 13 7.0 - 18 MG/DL    Creatinine 0.75 0.6 - 1.3 MG/DL    BUN/Creatinine ratio 17 12 - 20      GFR est AA >60 >60 ml/min/1.73m2    GFR est non-AA >60 >60 ml/min/1.73m2    Calcium 7.8 (L) 8.5 - 10.1 MG/DL   CBC WITH AUTOMATED DIFF    Collection Time: 11/12/19  5:20 AM   Result Value Ref Range    WBC 12.8 4.6 - 13.2 K/uL    RBC 2.98 (L) 4.70 - 5.50 M/uL    HGB 9.4 (L) 13.0 - 16.0 g/dL    HCT 27.5 (L) 36.0 - 48.0 %    MCV 92.3 74.0 - 97.0 FL    MCH 31.5 24.0 - 34.0 PG    MCHC 34.2 31.0 - 37.0 g/dL    RDW 13.7 11.6 - 14.5 %    PLATELET 899 191 - 657 K/uL    MPV 9.6 9.2 - 11.8 FL    NEUTROPHILS 78 (H) 40 - 73 %    LYMPHOCYTES 8 (L) 21 - 52 %    MONOCYTES 14 (H) 3 - 10 %    EOSINOPHILS 0 0 - 5 %    BASOPHILS 0 0 - 2 %    ABS. NEUTROPHILS 10.0 (H) 1.8 - 8.0 K/UL    ABS. LYMPHOCYTES 1.0 0.9 - 3.6 K/UL    ABS. MONOCYTES 1.8 (H) 0.05 - 1.2 K/UL    ABS. EOSINOPHILS 0.0 0.0 - 0.4 K/UL    ABS.  BASOPHILS 0.0 0.0 - 0.1 K/UL    DF AUTOMATED     MAGNESIUM    Collection Time: 11/12/19  5:20 AM   Result Value Ref Range    Magnesium 2.0 1.6 - 2.6 mg/dL   PHOSPHORUS    Collection Time: 11/12/19  5:20 AM   Result Value Ref Range    Phosphorus 3.9 2.5 - 4.9 MG/DL   GLUCOSE, POC    Collection Time: 11/12/19  5:21 AM   Result Value Ref Range    Glucose (POC) 139 (H) 70 - 110 mg/dL   POC G3 Collection Time: 11/12/19  5:26 AM   Result Value Ref Range    Device: VENT      FIO2 (POC) 35 %    pH (POC) 7.420 7.35 - 7.45      pCO2 (POC) 41.0 35.0 - 45.0 MMHG    pO2 (POC) 118 (H) 80 - 100 MMHG    HCO3 (POC) 26.7 (H) 22 - 26 MMOL/L    sO2 (POC) 99 (H) 92 - 97 %    Base excess (POC) 2 mmol/L    Mode ASSIST CONTROL      Tidal volume 500 ml    Set Rate 14 bpm    PEEP/CPAP (POC) 5 cmH2O    Allens test (POC) YES      Inspiratory Time 0.9 sec    Total resp. rate 14      Site RIGHT RADIAL      Patient temp. 97.6      Specimen type (POC) ARTERIAL      Performed by Adrianne Morton     Volume control plus YES             Recent Labs     11/12/19  0526 11/12/19  0022   FIO2I 35 50   HCO3I 26.7* 25.2   PCO2I 41.0 41.3   PHI 7.420 7.395   PO2I 118* 214*       All Micro Results     None          Telemetry: normal sinus rhythm      [x]See my orders for details    My assessment, plan of care, findings, medications, side effects etc were discussed with:  [x]nursing []PT/OT    [x]respiratory therapy []Dr. Darrell Solis [x]Patient     [x]Total critical care time exclusive of procedures 39 minutes with complex decision making performed and > 50% time spent in face to face evaluation.     Anabelle Shea MD

## 2019-11-12 NOTE — PROGRESS NOTES
Hospitalist Progress Note-critical care note     Patient: Warren Garcia MRN: [id number]  CSN: [id number]    YOB: 1965  Age: 62 y.o. Sex: male    DOA: 11/11/2019 LOS:  LOS: 1 day            Chief complaint: Postoperative anemia, GERD acute respiratory failure    Assessment/Plan         Hospital Problems  Date Reviewed: 11/11/2019          Codes Class Noted POA    Postoperative anemia due to acute blood loss ICD-10-CM: D62  ICD-9-CM: 285.1  11/12/2019 No        GERD (gastroesophageal reflux disease) ICD-10-CM: K21.9  ICD-9-CM: 530.81  11/12/2019 Unknown        Acute respiratory failure ([address] ICD-10-CM: J96.00  ICD-9-CM: 518.81  11/12/2019 Unknown        Hypomagnesemia ICD-10-CM: N07.07  ICD-9-CM: 275.2  11/12/2019 Unknown        * (Principal) DDD (degenerative disc disease), lumbar ICD-10-CM: M51.36  ICD-9-CM: 722.52  11/11/2019 Yes            Acute respiratory failure post operation  Resolving, extubated this morning, doing well on NC oxygen  Denies any shortness of breath or chest pain  Continue wean off NC oxygen    Post operation anemia due to acute blood loss  Continue monitor H&H, will transfuse as needed. So far no symptoms. Hypertension,  Metoprolol was hold due to hypotension, will restart when blood pressure is better    GERD continue PP    Hypomagnesemia   Resolved       DDD  Post surgery, manage primary team    Subjective, no chest pain no shortness of breath    RN, extubated this morning        Review of systems:    General: No fevers or chills. Cardiovascular: No chest pain or pressure. No palpitations. Pulmonary: No shortness of breath. Gastrointestinal: No nausea, vomiting.      Vital signs/Intake and Output:  Visit Vitals  BP 94/63   Pulse (!) 109   Temp 98.4 °F (36.9 °C)   Resp 14   Ht 5' 7\" (1.702 m)   Wt 76.3 kg (168 lb 4.8 oz)   SpO2 95%   BMI 26.36 kg/m²     Current Shift:  11/12 0701 - 11/12 1900  In: 398.8 [I.V.:398.8]  Out: 440 [Urine:350; Drains:90]  Last three shifts:  11/10 1901 - 11/12 0700  In: 8595.3 [I.V.:8045.3]  Out: 8247 [Urine:2275; Drains:350]    Physical Exam:  General: WD, WN. Alert, cooperative, no acute distress    HEENT: NC, Atraumatic. PERRLA, anicteric sclerae. Lungs: CTA Bilaterally. No Wheezing/Rhonchi/Rales. Heart:  Regular  rhythm,  No murmur, No Rubs, No Gallops  Abdomen: Soft, Non distended, Non tender.  +Bowel sounds, wound VAC noted  Extremities: No c/c/e  Psych:   Not anxious or agitated. Neurologic:  No acute neurological deficit. Labs: Results:       Chemistry Recent Labs     11/12/19  0520 11/11/19  2120   * 180*    140   K 3.9 3.9    107   CO2 26 24   BUN 13 12   CREA 0.75 0.84   CA 7.8* 8.0*   AGAP 8 9   BUCR 17 14      CBC w/Diff Recent Labs     11/12/19 0520 11/11/19  2140   WBC 12.8 15.0*   RBC 2.98* 3.18*   HGB 9.4* 10.1*   HCT 27.5* 29.6*    187   GRANS 78* 80*   LYMPH 8* 3*   EOS 0 0      Cardiac Enzymes No results for input(s): CPK, CKND1, FANY in the last 72 hours. No lab exists for component: CKRMB, TROIP   Coagulation No results for input(s): PTP, INR, APTT, INREXT, INREXT in the last 72 hours. Lipid Panel No results found for: CHOL, CHOLPOCT, CHOLX, CHLST, CHOLV, 089809, HDL, HDLP, LDL, LDLC, DLDLP, 539034, VLDLC, VLDL, TGLX, TRIGL, TRIGP, TGLPOCT, CHHD, CHHDX   BNP No results for input(s): BNPP in the last 72 hours. Liver Enzymes No results for input(s): TP, ALB, TBIL, AP, SGOT, GPT in the last 72 hours.     No lab exists for component: DBIL   Thyroid Studies No results found for: T4, T3U, TSH, TSHEXT, TSHEXT     Procedures/imaging: see electronic medical records for all procedures/Xrays and details which were not copied into this note but were reviewed prior to creation of Tan Lloyd MD

## 2019-11-12 NOTE — PROGRESS NOTES
Reason for Admission:   Elective lumbar procedure                   RRAT Score:    2                 Plan for utilizing home health:    TBD                      Current Advanced Directive/Advance Care Plan: not on chart                       Transition of Care Plan:   Chart reviewed pt admitted for elective ortho procedure, pt transferred to ICU post op Vent monitoring, hx includes valadez's esophagus,HTN,CVA, cm will attend IDR's and initiate d/c planning. Care Management Interventions  PCP Verified by CM: Yes  Palliative Care Criteria Met (RRAT>21 & CHF Dx)?: No  Physical Therapy Consult: Yes  Occupational Therapy Consult: Yes  Current Support Network: Lives with Spouse        1130- cm met with pt at bedside, cm role as d/c planner explained, pt lives at home with spouse , pt denies using an home walker but often has to stop walking and hold onto something due to discomfort,pt states wife's walker available if needed, pt wants to return back home when discharged, open to home health services if needed,cm provided [address] list for viewing,cm will cont to review for PT,OT recommendations.

## 2019-11-12 NOTE — PROGRESS NOTES
Subjective:  62 y.o. male post operative day 1 Status Post L4-S1 ALIF and L1-Pelvis lami fusion with facetectomy at L3. Pt. Still intubated this morning but moving all extremities. No O/N events. Labs:  Recent Results (from the past 24 hour(s))   GLUCOSE, POC    Collection Time: 11/11/19  9:10 PM   Result Value Ref Range    Glucose (POC) 158 (H) 70 - 029 mg/dL   METABOLIC PANEL, BASIC    Collection Time: 11/11/19  9:20 PM   Result Value Ref Range    Sodium 140 136 - 145 mmol/L    Potassium 3.9 3.5 - 5.5 mmol/L    Chloride 107 100 - 111 mmol/L    CO2 24 21 - 32 mmol/L    Anion gap 9 3.0 - 18 mmol/L    Glucose 180 (H) 74 - 99 mg/dL    BUN 12 7.0 - 18 MG/DL    Creatinine 0.84 0.6 - 1.3 MG/DL    BUN/Creatinine ratio 14 12 - 20      GFR est AA >60 >60 ml/min/1.73m2    GFR est non-AA >60 >60 ml/min/1.73m2    Calcium 8.0 (L) 8.5 - 10.1 MG/DL   MAGNESIUM    Collection Time: 11/11/19  9:20 PM   Result Value Ref Range    Magnesium 1.2 (L) 1.6 - 2.6 mg/dL   PHOSPHORUS    Collection Time: 11/11/19  9:20 PM   Result Value Ref Range    Phosphorus 3.2 2.5 - 4.9 MG/DL   CBC WITH AUTOMATED DIFF    Collection Time: 11/11/19  9:40 PM   Result Value Ref Range    WBC 15.0 (H) 4.6 - 13.2 K/uL    RBC 3.18 (L) 4.70 - 5.50 M/uL    HGB 10.1 (L) 13.0 - 16.0 g/dL    HCT 29.6 (L) 36.0 - 48.0 %    MCV 93.1 74.0 - 97.0 FL    MCH 31.8 24.0 - 34.0 PG    MCHC 34.1 31.0 - 37.0 g/dL    RDW 13.6 11.6 - 14.5 %    PLATELET 953 823 - 292 K/uL    MPV 9.5 9.2 - 11.8 FL    NEUTROPHILS 80 (H) 42 - 75 %    BAND NEUTROPHILS 9 (H) 0 - 5 %    LYMPHOCYTES 3 (L) 20 - 51 %    MONOCYTES 8 2 - 9 %    EOSINOPHILS 0 0 - 5 %    BASOPHILS 0 0 - 3 %    ABS. NEUTROPHILS 12.0 (H) 1.8 - 8.0 K/UL    ABS. LYMPHOCYTES 0.5 (L) 0.8 - 3.5 K/UL    ABS. MONOCYTES 1.2 (H) 0 - 1.0 K/UL    ABS. EOSINOPHILS 0.0 0.0 - 0.4 K/UL    ABS.  BASOPHILS 0.0 0.0 - 0.1 K/UL    RBC COMMENTS NORMOCYTIC, NORMOCHROMIC      DF MANUAL     GLUCOSE, POC    Collection Time: 11/12/19 12:06 AM   Result Value Ref Range    Glucose (POC) 145 (H) 70 - 110 mg/dL   POC G3    Collection Time: 11/12/19 12:22 AM   Result Value Ref Range    Device: VENT      FIO2 (POC) 50 %    pH (POC) 7.395 7.35 - 7.45      pCO2 (POC) 41.3 35.0 - 45.0 MMHG    pO2 (POC) 214 (H) 80 - 100 MMHG    HCO3 (POC) 25.2 22 - 26 MMOL/L    sO2 (POC) 100 (H) 92 - 97 %    Base excess (POC) 0 mmol/L    Mode ASSIST CONTROL      Tidal volume 500 ml    Set Rate 14 bpm    PEEP/CPAP (POC) 5 cmH2O    Allens test (POC) NO      Inspiratory Time 0.9 sec    Total resp. rate 14      Site DRAWN FROM ARTERIAL LINE      Patient temp. 99.0      Specimen type (POC) ARTERIAL      Performed by [employer] [facility]    METABOLIC PANEL, BASIC    Collection Time: 11/12/19  5:20 AM   Result Value Ref Range    Sodium 140 136 - 145 mmol/L    Potassium 3.9 3.5 - 5.5 mmol/L    Chloride 106 100 - 111 mmol/L    CO2 26 21 - 32 mmol/L    Anion gap 8 3.0 - 18 mmol/L    Glucose 128 (H) 74 - 99 mg/dL    BUN 13 7.0 - 18 MG/DL    Creatinine 0.75 0.6 - 1.3 MG/DL    BUN/Creatinine ratio 17 12 - 20      GFR est AA >60 >60 ml/min/1.73m2    GFR est non-AA >60 >60 ml/min/1.73m2    Calcium 7.8 (L) 8.5 - 10.1 MG/DL   CBC WITH AUTOMATED DIFF    Collection Time: 11/12/19  5:20 AM   Result Value Ref Range    WBC 12.8 4.6 - 13.2 K/uL    RBC 2.98 (L) 4.70 - 5.50 M/uL    HGB 9.4 (L) 13.0 - 16.0 g/dL    HCT 27.5 (L) 36.0 - 48.0 %    MCV 92.3 74.0 - 97.0 FL    MCH 31.5 24.0 - 34.0 PG    MCHC 34.2 31.0 - 37.0 g/dL    RDW 13.7 11.6 - 14.5 %    PLATELET 892 784 - 936 K/uL    MPV 9.6 9.2 - 11.8 FL    NEUTROPHILS 78 (H) 40 - 73 %    LYMPHOCYTES 8 (L) 21 - 52 %    MONOCYTES 14 (H) 3 - 10 %    EOSINOPHILS 0 0 - 5 %    BASOPHILS 0 0 - 2 %    ABS. NEUTROPHILS 10.0 (H) 1.8 - 8.0 K/UL    ABS. LYMPHOCYTES 1.0 0.9 - 3.6 K/UL    ABS. MONOCYTES 1.8 (H) 0.05 - 1.2 K/UL    ABS. EOSINOPHILS 0.0 0.0 - 0.4 K/UL    ABS.  BASOPHILS 0.0 0.0 - 0.1 K/UL    DF AUTOMATED     MAGNESIUM    Collection Time: 11/12/19  5:20 AM   Result Value Ref Range    Magnesium 2.0 1.6 - 2.6 mg/dL   PHOSPHORUS    Collection Time: 11/12/19  5:20 AM   Result Value Ref Range    Phosphorus 3.9 2.5 - 4.9 MG/DL   GLUCOSE, POC    Collection Time: 11/12/19  5:21 AM   Result Value Ref Range    Glucose (POC) 139 (H) 70 - 110 mg/dL   POC G3    Collection Time: 11/12/19  5:26 AM   Result Value Ref Range    Device: VENT      FIO2 (POC) 35 %    pH (POC) 7.420 7.35 - 7.45      pCO2 (POC) 41.0 35.0 - 45.0 MMHG    pO2 (POC) 118 (H) 80 - 100 MMHG    HCO3 (POC) 26.7 (H) 22 - 26 MMOL/L    sO2 (POC) 99 (H) 92 - 97 %    Base excess (POC) 2 mmol/L    Mode ASSIST CONTROL      Tidal volume 500 ml    Set Rate 14 bpm    PEEP/CPAP (POC) 5 cmH2O    Allens test (POC) YES      Inspiratory Time 0.9 sec    Total resp.  rate 14      Site RIGHT RADIAL      Patient temp. 97.6      Specimen type (POC) ARTERIAL      Performed by Kelly Mederos     Volume control plus YES         Meds:    Current Facility-Administered Medications:     Norepinephrine Bitartrate (LEVOPHED) 8 mg/250 mL (32 mcg/mL) in NS 0.9% 250 ml infusion, , , ,     0.9% sodium chloride infusion 250 mL, 250 mL, IntraVENous, PRN, Park Clayton MD    lactated Ringers infusion, 100 mL/hr, IntraVENous, CONTINUOUS, Khushbu Azevedo MD, Last Rate: 100 mL/hr at 11/11/19 2139, 100 mL/hr at 11/11/19 2139    sodium chloride (NS) flush 5-40 mL, 5-40 mL, IntraVENous, Q8H, Khushbu Azevedo MD, Stopped at 11/12/19 0600    sodium chloride (NS) flush 5-40 mL, 5-40 mL, IntraVENous, PRN, Khushbu Azevedo MD    naloxone [facility]) injection 0.2 mg, 0.2 mg, IntraVENous, PRN, Khushbu Azevedo MD    glucose chewable tablet 16 g, 4 Tab, Oral, PRN, Khushbu Azevedo MD    glucagon [facility]) injection 1 mg, 1 mg, IntraMUSCular, ROSASN, Khushbu Azevedo MD    dextrose 10% infusion 125-250 mL, 125-250 mL, IntraVENous, PRN, Khushbu Azevedo MD    fentaNYL citrate (PF) injection 25 mcg, 25 mcg, IntraVENous, PRN, Khushbu Azevedo MD    0.9% sodium chloride infusion 250 mL, 250 mL, IntraVENous, PRN, Tan Olivo MD    aspirin tablet 325 mg, 325 mg, Oral, DAILY, Tan Olivo MD    venlafaxine-SR [facility] capsule 150 mg, 150 mg, Oral, DAILY, Tan Olivo MD    allopurinol (ZYLOPRIM) tablet 300 mg, 300 mg, Oral, DAILY, Tan Olivo MD    atorvastatin (LIPITOR) tablet 80 mg, 80 mg, Oral, DAILY, Tan Olivo MD    [Held by provider] metoprolol succinate (TOPROL-XL) XL tablet 50 mg, 50 mg, Oral, DAILY, Tan Olivo MD    ferrous sulfate tablet 325 mg, 325 mg, Oral, ACB, Tan Olivo MD    ascorbic acid (vitamin C) (VITAMIN C) tablet 1,000 mg, 1,000 mg, Oral, DAILY, Tan Olivo MD    sodium chloride (NS) flush 5-40 mL, 5-40 mL, IntraVENous, PRN, Tan Olivo MD    acetaminophen (TYLENOL) tablet 650 mg, 650 mg, Oral, Q4H PRN, Tan Olivo MD    oxyCODONE IR (ROXICODONE) tablet 5 mg, 5 mg, Oral, Q4H, Tan Olivo MD, Stopped at 11/11/19 2100    morphine injection 2 mg, 2 mg, IntraVENous, Q4H PRN, Tan Olivo MD    naloxone [facility]) injection 0.4 mg, 0.4 mg, IntraVENous, PRN, Tan Olivo MD    ondansetron (ZOFRAN) injection 4 mg, 4 mg, IntraVENous, Q4H PRN, Tan Olivo MD    phenol throat spray (CHLORASEPTIC) 1 Spray, 1 Spray, Oral, PRN, Tan Olivo MD    diphenhydrAMINE (BENADRYL) capsule 25 mg, 25 mg, Oral, Q4H PRN, Tan Olivo MD    pantoprazole (PROTONIX) tablet 40 mg, 40 mg, Oral, DAILY, Junito Delgadillo MD    cyclobenzaprine (FLEXERIL) tablet 5 mg, 5 mg, Oral, TID PRN, Tan Olivo MD    docusate sodium (COLACE) capsule 100 mg, 100 mg, Oral, BID, Tan Olivo MD, Stopped at 11/11/19 2100    ceFAZolin (ANCEF) 2 g/20 mL in sterile water IV syringe, 2 g, IntraVENous, Q8H, Tan Olivo MD, 2 g at 11/11/19 2321    dexAMETHasone (DECADRON) tablet 4 mg, 4 mg, Oral, Q8H, Junito Delgadillo MD    fentaNYL citrate (PF) injection  mcg,  mcg, IntraVENous, Q4H PRN, Rhonda CONTRERAS MD, 100 mcg at 11/11/19 2137    fentaNYL (PF) 900 mcg/30 ml infusion soln, 0-200 mcg/hr, IntraVENous, TITRATE, Dayanna CONTRERAS MD, Last Rate: 6.7 mL/hr at 11/12/19 0432, 200 mcg/hr at 11/12/19 0432    midazolam in normal saline (VERSED) 2 mg/mL infusion, 1-3 mg/hr, IntraVENous, TITRATE, Dayanna CONTRERAS MD, Last Rate: 1.5 mL/hr at 11/11/19 2301, 3 mg/hr at 11/11/19 2301    chlorhexidine (PERIDEX) 0.12 % mouthwash 10 mL, 10 mL, Oral, Q12H, Andrew Casiano MD, 10 mL at 11/11/19 2139    albuterol-ipratropium (DUO-NEB) 2.5 MG-0.5 MG/3 ML, 3 mL, Nebulization, Q6H PRN, Dayanna CONTRERAS MD    dexamethasone (DECADRON) 4 mg/mL injection 4 mg, 4 mg, IntraVENous, Q8H, Walt POLLOCK MD, 4 mg at 11/12/19 0604    ELECTROLYTE REPLACEMENT PROTOCOL - Potassium Standard Dosing , 1 Each, Other, PRN, Quincy Block MD    ELECTROLYTE REPLACEMENT PROTOCOL - Magnesium , 1 Each, Other, PRN, Quincy Block MD    ELECTROLYTE REPLACEMENT PROTOCOL - Phosphorus  Standard Dosing, 1 Each, Other, PRN, Quincy Block MD  Blood Culture:  No components found for: BLOODCX  Wound Culture:  No results found for: WOUNDCULT  Ins and Outs:  No intake/output data recorded. Physical Exam:  Visit Vitals  /72   Pulse 93   Temp 97.6 °F (36.4 °C)   Resp 14   Ht 5' 7\" (1.702 m)   Wt 76.3 kg (168 lb 4.8 oz)   SpO2 99%   BMI 26.36 kg/m²     ? General: Awake, Alert, Oriented  ? Eyes: Pupils equal, round and reactive to light  ? Heart: regular rate and rhythm  ? Lungs: No audible wheezing  ?  Abdomen: soft  Lumbar   Dressing C/D/I   Sensation grossly intact   + motor grossly throughout    Unable to assess further due to intubation        Assessment:    POD 1 From above surgery Doing well    Plan:  Weight bearing as tolerated all extremities  Up and out of bed  DVT prophylaxis- mechanical  Antibiotics: complete course  Drain maintain drain  Attempt extubation later today per ICU team  Pain control: analgesics  PT/OT  Discharge planning in progress    Tori Goodpasture, MD

## 2019-11-12 NOTE — ANESTHESIA POSTPROCEDURE EVALUATION
.  Post-Anesthesia Evaluation & Assessment    Visit Vitals  /65 (BP 1 Location: Right arm, BP Patient Position: At rest;Supine)   Pulse (!) 101   Temp 37 °C (98.6 °F)   Resp 16   Ht 5' 7\" (1.702 m)   Wt 76.3 kg (168 lb 4.8 oz)   SpO2 99%   BMI 26.36 kg/m²       Nausea/Vomiting: no nausea    Post-operative hydration adequate. Pain score (VAS): 0    Mental status & Level of consciousness: Pt sedated, intubated, to ICU. Neurological status: moves all extremities      Pulmonary status: Intubated  Complications related to anesthesia: none apparent.     Additional comments:Pt to ICU

## 2019-11-12 NOTE — PROGRESS NOTES
11/12/2019 PT note: consult received and chart reviewed. Pt extubated by RT and placed on [facility] at [address]. Will f/u in the pm for PT evaluation as appropriate and notify nurse. Thank you.    Ana Laura Staley, PT

## 2019-11-12 NOTE — PROGRESS NOTES
Problem: Mobility Impaired (Adult and Pediatric)  Goal: *Acute Goals and Plan of Care (Insert Text)  Description  Physical Therapy Goals  Initiated 11/12/2019  to be met within 3-4 days  Bed mobility:  Log rolling side to side and supine <> sit S/SBA for OOB activities. Sitting Balance: Tolerate up in chair 30 minutes for ADLs. Transfers:  Sit <> stand with S/SBA in prep for gait. Standing/Ambulation Balance:  Good with RW for safe transfers and gait. Ambulation:  Ambulate 100ft with S/SBA with RW for home mobility at discharge. Stairs: Ascend/Descend 3-5 steps CGA/HR for home re-entry as needed. Outcome: Progressing Towards Goal     PHYSICAL THERAPY EVALUATION    Patient: Sharron Gonzales (66 y.o. male)  Date: 11/12/2019  Primary Diagnosis: Scoliosis deformity of spine [M41.9]  Procedure(s) (LRB):  ANTERIOR LUMBAR INTERBODY FUSION LUMBAR 4-SACRAL 1, POSTERIOR LUMBAR 1- PELVIS LAMINECTOMY AND FUSION WITH LUMBAR 3 OSTEOTOMY WITH ALLOGRAFT, STRUCTURAL  AND MORSELIZED. WITH NEUROMONITORING WITH C-ARM (N/A) 1 Day Post-Op   Precautions: Back, Fall    ASSESSMENT :  Based on the objective data described below, the patient is a 61 yo M seen in ICU post surgery as noted above. Pt transferred to ICU with patient left on ventilator post-op for monitoring overnight (d/t significant length of surgery > 9 hrs and EBL 1500). Pt found supine in bed with ICU monitoring, coleman catheter, drain and SCD's in place. Pt presents with decreased independence in functional mobility with regard to bed mobility, transfers, gait, and activity tolerance. Pt educated in back precautions as appropriate, and log rolling. Pt transitioned to sit EOB via log roll with min/CGA/vc additional time. No c/o dizziness/light headedness upon sitting. Completed transfers sit >stand with min/CGA using RW and able to take several lateral step along side of bed with RW/min/CGA. Pt with antalgic gt pattern, verbal cues for pacing activity. Pt returned to bed and left in L side lying position with all needs in reach and SCD's on. Pain pre activity 9/10; post 3/10. Nurse Radha Emmanuel made aware of above. Recommend HHPT upon discharge to continue rehab and reach maximal level of functional mobility independence. Pt Education: Role of physical therapy in acute care setting, fall prevention and safety/technique during functional mobility tasks      Patient will benefit from skilled intervention to address the above impairments. Patients rehabilitation potential is considered to be Good  Factors which may influence rehabilitation potential include:   ? None noted  ? Mental ability/status  ? Medical condition  ? Home/family situation and support systems  ? Safety awareness  ? Pain tolerance/management  ? Other:      PLAN :  Recommendations and Planned Interventions:  ?           Bed Mobility Training             ? Neuromuscular Re-Education  ? Transfer Training                   ? Orthotic/Prosthetic Training  ? Gait Training                          ? Modalities  ? Therapeutic Exercises          ? Edema Management/Control  ? Therapeutic Activities            ? Patient and Family Training/Education  ? Other (comment):    Frequency/Duration: Patient will be followed by physical therapy 1-2 times per day to address goals. Discharge Recommendations:Home Health   Further Equipment Recommendations for Discharge: N/A     SUBJECTIVE:   Patient stated Hurts.     OBJECTIVE DATA SUMMARY:     Past Medical History:   Diagnosis Date    Arthritis     Valencia's esophagus     Chronic pain     intermittent lumbar x 5 years    Diverticulitis     GERD (gastroesophageal reflux disease)     Hypertension     Ill-defined condition     gout    Ill-defined condition     high cholesterol    Ill-defined condition     anxiety    Stroke ([facility] [address] 2014     Past Surgical History:   Procedure Laterality Date    HX GI      colon resection, diverticulitis    HX HERNIA REPAIR      Incisional    HX HERNIA REPAIR  1968    Ingunial     Barriers to Learning/Limitations: yes;  physical  Compensate with: Visual Cues, Verbal Cues and Tactile Cues  Prior Level of Function/Home Situation: amb w/o AD, but would take rest breaks as needed  Home Situation  Home Environment: Private residence  # Steps to Enter: 4  Rails to Enter: Yes  Hand Rails : Bilateral  One/Two Story Residence: One story  Living Alone: No  Support Systems: Spouse/Significant Other/Partner  Patient Expects to be Discharged to[de-identified] Private residence  Current DME Used/Available at Home: Walker, rolling, Other (comment)(walking stick)  Critical Behavior:  Neurologic State: Alert; Appropriate for age  Orientation Level: Oriented X4  Cognition: Follows commands  Safety/Judgement: Awareness of environment; Fall prevention  Psychosocial  Patient Behaviors: Calm; Cooperative  Needs Expressed: Educational  Purposeful Interaction: Yes  Pt Identified Daily Priority: Clinical issues (comment); Communication issues (comment)  Caritas Process: Nurture loving kindness;Establish trust;Attend basic human needs;Create healing environment; Teaching/learning  Caring Interventions: Reassure; Therapeutic modalities  Reassure: Informing; Instilling ray and hope;Caring rounds  Therapeutic Modalities: Intentional therapeutic touch;Humor  Skin Condition/Temp: Dry;Warm  Skin Integrity: Incision (comment)(surgical back and abd incisions; dressing CDI)  Skin Integumentary  Skin Color: Appropriate for ethnicity  Skin Condition/Temp: Dry;Warm  Skin Integrity: Incision (comment)(surgical back and abd incisions; dressing CDI)  Turgor: Non-tenting  Hair Growth: Present  Varicosities: Absent  Strength:    Strength: Generally decreased, functional  Tone & Sensation:   Tone: Normal  Sensation: Intact  Range Of Motion:  AROM: Generally decreased, functional  Functional Mobility:  Bed Mobility:  Rolling: Minimum assistance;Contact guard assistance; Additional time(vc)  Supine to Sit: Minimum assistance;Contact guard assistance; Additional time  Sit to Supine: Minimum assistance;Contact guard assistance  Scooting: Contact guard assistance  Transfers:  Sit to Stand: Minimum assistance;Contact guard assistance  Stand to Sit: Contact guard assistance  Balance:   Sitting: Intact  Standing: Impaired; With support  Standing - Static: Good  Standing - Dynamic : Fair  Ambulation/Gait Training:  Distance (ft): 3 Feet (ft)(lateral steps along side of bed)  Assistive Device: Walker, rolling  Ambulation - Level of Assistance: Minimal assistance;Contact guard assistance  Gait Description (WDL): Exceptions to WDL  Gait Abnormalities: Antalgic;Decreased step clearance  Speed/Yuliya: Pace decreased (<100 feet/min)  Step Length: Right shortened;Left shortened  Interventions: Safety awareness training; Tactile cues; Visual/Demos; Verbal cues  Pain:  Pain Scale 1: Numeric (0 - 10)  Pain Intensity 1: 3  Pain Location 1: Back  Pain Orientation 1: Lower  Pain Description 1: Aching  Pain Intervention(s) 1: Other (comment)(has received pain meds)  Activity Tolerance:   Fair   Please refer to the flowsheet for vital signs taken during this treatment. After treatment:   ?         Patient left in no apparent distress sitting up in chair  ? Patient left in no apparent distress in bed in L side lying  ? Call bell left within reach  ? Nursing notified  ? Caregiver present  ? Bed alarm activated    COMMUNICATION/EDUCATION:   ?         Fall prevention education was provided and the patient/caregiver indicated understanding. ? Patient/family have participated as able in goal setting and plan of care. ?         Patient/family agree to work toward stated goals and plan of care.   ?         Patient understands intent and goals of therapy, but is neutral about his/her participation. ? Patient is unable to participate in goal setting and plan of care.     Eval Complexity: History: HIGH Complexity :3+ comorbidities / personal factors will impact the outcome/ POC Exam:MEDIUM Complexity : 3 Standardized tests and measures addressing body structure, function, activity limitation and / or participation in recreation  Presentation: MEDIUM Complexity : Evolving with changing characteristics  Clinical Decision Making:Medium Complexity    Overall Complexity:MEDIUM    Thank you for this referral.  Natasha January, PT   Time Calculation: 40 mins

## 2019-11-12 NOTE — CDMP QUERY
Pt admitted for surgery Medical record documentation indicates a diagnosis of post-operative respiratory failure. Please clarify the cause with correlating clinical indicators to support such: 
. Gerldine Prost              Respiratory failure following surgery due to a chronic underlying condition (i.e. COPD) Gerldine Prost No Post-op Respiratory failure left on vent to protect airway and due to long surgery.              Respiratory failure following surgery which was not routinely expected and was  a   complication   of the surgical procedure              Respiratory failure was due to events unrelated to the surgical operation (Please      Specify)              Acute Pulmonary Insufficiency following surgery              Other (Please Specify)              Unable to determine The medical record reflects the following: 
   Risk Factors: surgery Clinical Indicators: In surgery > 9hrs. Decision to leave On vent by surgeon Thank-You Deven Hardy RN [address] [place] [address] [phone]

## 2019-11-12 NOTE — PROGRESS NOTES
Bedside shift change report received from [facility]. Report included the following information SBAR, Kardex, Intake/Output, MAR, Recent Results, Med Rec Status and Cardiac Rhythm NSR and plan of care. 0815: Shift assessment complete. See flowsheet. 6260: Pt extubated by RT and placed on 2LNC  1045: Pt pulled out art line. Pressure held. Dressing applied. MD aware. 1200: Reassessment complete. See flowsheet. 1600: Reassessment complete. See flowsheet. Bedside shift change report given to [facility]. Report included the following information SBAR, Kardex, Intake/Output, MAR, Recent Results, Med Rec Status and Cardiac Rhythm NSR and plan of care.

## 2019-11-12 NOTE — CONSULTS
Medicine Consult    Patient:  Elena Rader 62 y.o. male  Asked to evaluate patient by Dr. Agueda Comer/Dr. Eunice Chung  Primary Care Provider:  Franko Apple MD  Date of Admission:  11/11/2019  Reason for Consult: ICU monitoring postoperatively        Assessment/Plan     Patient Active Problem List   Diagnosis Code    DDD (degenerative disc disease), lumbar M51.36       PLAN:    63 y/o male s/p ANTERIOR LUMBAR INTERBODY FUSION LUMBAR 4-SACRAL 1, POSTERIOR LUMBAR 1- PELVIS LAMINECTOMY AND FUSION WITH LUMBAR 3 OSTEOTOMY WITH ALLOGRAFT, STRUCTURAL  AND MORSELIZED. WITH NEUROMONITORING WITH C-ARM complicated by prolonged operative time greater than 9 hours as well as EBL of 1500 cc status post transfusion of 2 units PRBC. The surgeon and anesthesiologist decided to keep the patient intubated overnight for recovery. Neuro intubated and sedated with propofol. Fentanyl gtt for pain control. Pulm intubated and ventilated with minimal settings. Expect planned extubation in the morning. GI no issues. OG tube not in place due to planned extubation in the morning. Renalno issues  Heme status post 1500 cc EBL intraoperatively and 2 units PRBC. Continue to monitor  IDno issues. Leukocytosis due to steroids. Endomonitor blood sugars q6 given steroids. Ortho-no restrictions on position, postop care per surgical service  F/E/N-maintenance IV fluids/replete PRN/n.p.o.     Prophylaxis: SCDs, protonix IV, no AC as he is recently postop with bleeding    Jared Rushing MD  Nocturnist    Critical Care Time:   [id number]  I have spent 30 minutes of critical care time involved in lab review, consultations with specialist, family decision-making, and documentation. Leviunita Innocent this entire length of time I was immediately available to the patient.     Critical Care:  The reason for providing this level of medical care for this critically ill patient was due a critical illness that impaired one or more vital organ systems such that there was a high probability of imminent or life threatening deterioration in the patients condition. This care involved high complexity decision making to assess, manipulate, and support vital system functions, to treat this degree vital organ system failure and to prevent further life threatening deterioration of the patients condition. Thank you for allowing us to participate in this patient's care. HPI:   CC:  Sameer Alfonso is a 62 y.o. male with past medical history significant for chronic back pain status post lumbar laminectomy and fusion today. He had a prolonged operative course with EBL of 1500 cc. He was kept intubated and ventilated postoperatively. Past Medical History:   Diagnosis Date    Arthritis     Valencia's esophagus     Chronic pain     intermittent lumbar x 5 years    Diverticulitis     GERD (gastroesophageal reflux disease)     Hypertension     Ill-defined condition     gout    Ill-defined condition     high cholesterol    Ill-defined condition     anxiety    Stroke ([facility] [address] 2014     Past Surgical History:   Procedure Laterality Date    HX GI      colon resection, diverticulitis    HX HERNIA REPAIR      Incisional    HX HERNIA REPAIR  1968    Ingunial      Social History     Tobacco Use    Smoking status: Never Smoker    Smokeless tobacco: Never Used   Substance Use Topics    Alcohol use: Not Currently     Comment: last etoh 05/2015    Drug use: Yes     Types: Marijuana     Comment: patient instructed to stop smoking x 24 hours prior to DOS     History reviewed. No pertinent family history. No current facility-administered medications on file prior to encounter. Current Outpatient Medications on File Prior to Encounter   Medication Sig Dispense Refill    aspirin (ASPIRIN) 325 mg tablet Take 325 mg by mouth daily.  venlafaxine-SR (EFFEXOR XR) 150 mg capsule Take 150 mg by mouth daily.       allopurinol (ZYLOPRIM) 300 mg tablet Take 300 mg by mouth daily.      atorvastatin (LIPITOR) 20 mg tablet Take 80 mg by mouth daily.  ALPRAZolam (XANAX) 0.5 mg tablet Take 0.5 mg by mouth two (2) times daily as needed. Indications: anxious      omeprazole (PRILOSEC) 40 mg capsule Take 1 Cap by mouth daily. Take half an hour before first meal of the day  Indications: EROSIVE ESOPHAGITIS, Valencia's esophagitis (Patient taking differently: Take 40 mg by mouth two (2) times a day. Take half an hour before first meal of the day  Indications: inflammation of the esophagus with erosion, Valencia's esophagitis) 90 Cap 3      Allergies   Allergen Reactions    Cat Dander Swelling     Swelling of eyes and blisters           Review of Systems  Unable to obtain due to patient condition      Physical Exam:      Visit Vitals  /77 (BP 1 Location: Right arm, BP Patient Position: At rest;Supine)   Pulse 96   Temp 98 °F (36.7 °C)   Resp 14   Ht 5' 7\" (1.702 m)   Wt 76.3 kg (168 lb 4.8 oz)   SpO2 99%   BMI 26.36 kg/m²     Body mass index is 26.36 kg/m².     Physical Exam:  GEN: intubated and sedated  HEENT: atraumatic  NECK: supple, trachea midline  EYES: closed  HEART: RRR with out m/r/g, pmi nondisplaced, pulses 2+ distally  LUNGS: equal chest wall expansion, cta bl with out wheezes/rales or rhonchi  AB: soft, +BS, nt/nd no organomegaly  NEURO: moving all extremities when awake  SKIN: dry, intact, warm        Laboratory Studies:    Recent Results (from the past 24 hour(s))   PLATELETS, ALLOCATE    Collection Time: 11/11/19  7:15 AM   Result Value Ref Range    CALLED TO: JENY AT 0915 ON 11/11/19 LAD     Unit number [id number]     Blood component type PLTPH LR,2     Unit division 00     Status of unit REL FROM [facility]     Unit number [id number]     Blood component type PLTPH LR,2     Unit division 00     Status of unit REL FROM [facility]    GLUCOSE, POC    Collection Time: 11/11/19  9:10 PM   Result Value Ref Range    Glucose (POC) 158 (H) 70 - 494 mg/dL   METABOLIC PANEL, BASIC    Collection Time: 11/11/19  9:20 PM   Result Value Ref Range    Sodium 140 136 - 145 mmol/L    Potassium 3.9 3.5 - 5.5 mmol/L    Chloride 107 100 - 111 mmol/L    CO2 24 21 - 32 mmol/L    Anion gap 9 3.0 - 18 mmol/L    Glucose 180 (H) 74 - 99 mg/dL    BUN 12 7.0 - 18 MG/DL    Creatinine 0.84 0.6 - 1.3 MG/DL    BUN/Creatinine ratio 14 12 - 20      GFR est AA >60 >60 ml/min/1.73m2    GFR est non-AA >60 >60 ml/min/1.73m2    Calcium 8.0 (L) 8.5 - 10.1 MG/DL   MAGNESIUM    Collection Time: 11/11/19  9:20 PM   Result Value Ref Range    Magnesium 1.2 (L) 1.6 - 2.6 mg/dL   PHOSPHORUS    Collection Time: 11/11/19  9:20 PM   Result Value Ref Range    Phosphorus 3.2 2.5 - 4.9 MG/DL   CBC WITH AUTOMATED DIFF    Collection Time: 11/11/19  9:40 PM   Result Value Ref Range    WBC 15.0 (H) 4.6 - 13.2 K/uL    RBC 3.18 (L) 4.70 - 5.50 M/uL    HGB 10.1 (L) 13.0 - 16.0 g/dL    HCT 29.6 (L) 36.0 - 48.0 %    MCV 93.1 74.0 - 97.0 FL    MCH 31.8 24.0 - 34.0 PG    MCHC 34.1 31.0 - 37.0 g/dL    RDW 13.6 11.6 - 14.5 %    PLATELET 084 073 - 371 K/uL    MPV 9.5 9.2 - 11.8 FL    NEUTROPHILS 80 (H) 42 - 75 %    BAND NEUTROPHILS 9 (H) 0 - 5 %    LYMPHOCYTES 3 (L) 20 - 51 %    MONOCYTES 8 2 - 9 %    EOSINOPHILS 0 0 - 5 %    BASOPHILS 0 0 - 3 %    ABS. NEUTROPHILS 12.0 (H) 1.8 - 8.0 K/UL    ABS. LYMPHOCYTES 0.5 (L) 0.8 - 3.5 K/UL    ABS. MONOCYTES 1.2 (H) 0 - 1.0 K/UL    ABS. EOSINOPHILS 0.0 0.0 - 0.4 K/UL    ABS.  BASOPHILS 0.0 0.0 - 0.1 K/UL    RBC COMMENTS NORMOCYTIC, NORMOCHROMIC      DF MANUAL     GLUCOSE, POC    Collection Time: 11/12/19 12:06 AM   Result Value Ref Range    Glucose (POC) 145 (H) 70 - 110 mg/dL   POC G3    Collection Time: 11/12/19 12:22 AM   Result Value Ref Range    Device: VENT      FIO2 (POC) 50 %    pH (POC) 7.395 7.35 - 7.45      pCO2 (POC) 41.3 35.0 - 45.0 MMHG    pO2 (POC) 214 (H) 80 - 100 MMHG    HCO3 (POC) 25.2 22 - 26 MMOL/L    sO2 (POC) 100 (H) 92 - 97 %    Base excess (POC) 0 mmol/L    Mode ASSIST CONTROL Tidal volume 500 ml    Set Rate 14 bpm    PEEP/CPAP (POC) 5 cmH2O    Allens test (POC) NO      Inspiratory Time 0.9 sec    Total resp. rate 14      Site DRAWN FROM ARTERIAL LINE      Patient temp.  99.0      Specimen type (POC) ARTERIAL      Performed by Kelly Mederos

## 2019-11-13 ENCOUNTER — APPOINTMENT (OUTPATIENT)
Dept: GENERAL RADIOLOGY | Age: 54
DRG: 454 | End: 2019-11-13
Attending: ORTHOPAEDIC SURGERY
Payer: COMMERCIAL

## 2019-11-13 VITALS
HEART RATE: 98 BPM | SYSTOLIC BLOOD PRESSURE: 137 MMHG | BODY MASS INDEX: 26.42 KG/M2 | TEMPERATURE: 98.2 F | HEIGHT: 67 IN | OXYGEN SATURATION: 99 % | RESPIRATION RATE: 18 BRPM | DIASTOLIC BLOOD PRESSURE: 83 MMHG | WEIGHT: 168.3 LBS

## 2019-11-13 LAB
BASOPHILS # BLD: 0 K/UL (ref 0–0.1)
BASOPHILS NFR BLD: 0 % (ref 0–3)
DIFFERENTIAL METHOD BLD: ABNORMAL
EOSINOPHIL # BLD: 0 K/UL (ref 0–0.4)
EOSINOPHIL NFR BLD: 0 % (ref 0–5)
ERYTHROCYTE [DISTWIDTH] IN BLOOD BY AUTOMATED COUNT: 13.9 % (ref 11.6–14.5)
HCT VFR BLD AUTO: 23.8 % (ref 36–48)
HGB BLD-MCNC: 8.1 G/DL (ref 13–16)
LYMPHOCYTES # BLD: 0.9 K/UL (ref 0.8–3.5)
LYMPHOCYTES NFR BLD: 6 % (ref 20–51)
MCH RBC QN AUTO: 31.6 PG (ref 24–34)
MCHC RBC AUTO-ENTMCNC: 34 G/DL (ref 31–37)
MCV RBC AUTO: 93 FL (ref 74–97)
MONOCYTES # BLD: 1.7 K/UL (ref 0–1)
MONOCYTES NFR BLD: 12 % (ref 2–9)
NEUTS SEG # BLD: 11.8 K/UL (ref 1.8–8)
NEUTS SEG NFR BLD: 82 % (ref 42–75)
PLATELET # BLD AUTO: 171 K/UL (ref 135–420)
PMV BLD AUTO: 9.3 FL (ref 9.2–11.8)
RBC # BLD AUTO: 2.56 M/UL (ref 4.7–5.5)
RBC MORPH BLD: ABNORMAL
WBC # BLD AUTO: 14.4 K/UL (ref 4.6–13.2)

## 2019-11-13 PROCEDURE — 74011250637 HC RX REV CODE- 250/637: Performed by: ORTHOPAEDIC SURGERY

## 2019-11-13 PROCEDURE — 74011250636 HC RX REV CODE- 250/636: Performed by: ORTHOPAEDIC SURGERY

## 2019-11-13 PROCEDURE — 90471 IMMUNIZATION ADMIN: CPT

## 2019-11-13 PROCEDURE — 97530 THERAPEUTIC ACTIVITIES: CPT

## 2019-11-13 PROCEDURE — 97166 OT EVAL MOD COMPLEX 45 MIN: CPT

## 2019-11-13 PROCEDURE — 72100 X-RAY EXAM L-S SPINE 2/3 VWS: CPT

## 2019-11-13 PROCEDURE — 97116 GAIT TRAINING THERAPY: CPT

## 2019-11-13 PROCEDURE — 77030011256 HC DRSG MEPILEX <16IN NO BORD MOLN -A

## 2019-11-13 PROCEDURE — 90686 IIV4 VACC NO PRSV 0.5 ML IM: CPT | Performed by: ORTHOPAEDIC SURGERY

## 2019-11-13 PROCEDURE — 36415 COLL VENOUS BLD VENIPUNCTURE: CPT

## 2019-11-13 PROCEDURE — 74011250636 HC RX REV CODE- 250/636: Performed by: SPECIALIST

## 2019-11-13 PROCEDURE — 74011250636 HC RX REV CODE- 250/636: Performed by: FAMILY MEDICINE

## 2019-11-13 PROCEDURE — 85025 COMPLETE CBC W/AUTO DIFF WBC: CPT

## 2019-11-13 RX ORDER — OXYCODONE HYDROCHLORIDE 5 MG/1
5 TABLET ORAL
Qty: 28 TAB | Refills: 0 | Status: SHIPPED | OUTPATIENT
Start: 2019-11-13 | End: 2019-11-20

## 2019-11-13 RX ORDER — METOPROLOL SUCCINATE 25 MG/1
25 TABLET, EXTENDED RELEASE ORAL DAILY
Status: DISCONTINUED | OUTPATIENT
Start: 2019-11-14 | End: 2019-11-13 | Stop reason: HOSPADM

## 2019-11-13 RX ORDER — ACETAMINOPHEN 325 MG/1
650 TABLET ORAL EVERY 6 HOURS
Qty: 168 TAB | Refills: 0 | Status: SHIPPED | OUTPATIENT
Start: 2019-11-13 | End: 2019-12-04

## 2019-11-13 RX ORDER — GABAPENTIN 300 MG/1
300 CAPSULE ORAL 3 TIMES DAILY
Qty: 90 CAP | Refills: 0 | Status: SHIPPED | OUTPATIENT
Start: 2019-11-13 | End: 2020-02-14

## 2019-11-13 RX ORDER — CYCLOBENZAPRINE HCL 10 MG
5 TABLET ORAL
Qty: 90 TAB | Refills: 0 | Status: SHIPPED | OUTPATIENT
Start: 2019-11-13 | End: 2020-02-18

## 2019-11-13 RX ADMIN — ASPIRIN 325 MG ORAL TABLET 325 MG: 325 PILL ORAL at 09:36

## 2019-11-13 RX ADMIN — ALLOPURINOL 300 MG: 300 TABLET ORAL at 09:36

## 2019-11-13 RX ADMIN — OXYCODONE HYDROCHLORIDE 5 MG: 5 TABLET ORAL at 04:17

## 2019-11-13 RX ADMIN — Medication 1000 MG: at 09:36

## 2019-11-13 RX ADMIN — VENLAFAXINE HYDROCHLORIDE 150 MG: 75 CAPSULE, EXTENDED RELEASE ORAL at 10:55

## 2019-11-13 RX ADMIN — INFLUENZA VIRUS VACCINE 0.5 ML: 15; 15; 15; 15 SUSPENSION INTRAMUSCULAR at 17:01

## 2019-11-13 RX ADMIN — PANTOPRAZOLE SODIUM 40 MG: 40 TABLET, DELAYED RELEASE ORAL at 10:55

## 2019-11-13 RX ADMIN — OXYCODONE HYDROCHLORIDE 5 MG: 5 TABLET ORAL at 14:19

## 2019-11-13 RX ADMIN — DOCUSATE SODIUM 100 MG: 100 CAPSULE, LIQUID FILLED ORAL at 09:36

## 2019-11-13 RX ADMIN — DEXAMETHASONE SODIUM PHOSPHATE 4 MG: 4 INJECTION, SOLUTION INTRAMUSCULAR; INTRAVENOUS at 14:19

## 2019-11-13 RX ADMIN — SODIUM CHLORIDE, SODIUM LACTATE, POTASSIUM CHLORIDE, AND CALCIUM CHLORIDE 100 ML/HR: 600; 310; 30; 20 INJECTION, SOLUTION INTRAVENOUS at 05:29

## 2019-11-13 RX ADMIN — OXYCODONE HYDROCHLORIDE 5 MG: 5 TABLET ORAL at 09:36

## 2019-11-13 RX ADMIN — OXYCODONE HYDROCHLORIDE 5 MG: 5 TABLET ORAL at 00:05

## 2019-11-13 RX ADMIN — FERROUS SULFATE TAB 325 MG (65 MG ELEMENTAL FE) 325 MG: 325 (65 FE) TAB at 06:38

## 2019-11-13 RX ADMIN — ATORVASTATIN CALCIUM 80 MG: 20 TABLET, FILM COATED ORAL at 09:36

## 2019-11-13 RX ADMIN — DEXAMETHASONE SODIUM PHOSPHATE 4 MG: 4 INJECTION, SOLUTION INTRAMUSCULAR; INTRAVENOUS at 06:38

## 2019-11-13 NOTE — PROGRESS NOTES
Problem: Falls - Risk of  Goal: *Absence of Falls  Description  Document Mariama Cavanaugh Fall Risk and appropriate interventions in the flowsheet.   Outcome: Progressing Towards Goal  Note:   Fall Risk Interventions:       Mentation Interventions: Adequate sleep, hydration, pain control, Update white board, Increase mobility    Medication Interventions: Teach patient to arise slowly, Patient to call before getting OOB    Elimination Interventions: Call light in reach, Stay With Me (per policy), Patient to call for help with toileting needs

## 2019-11-13 NOTE — PROGRESS NOTES
Patient was visited by [facility] who offered Pastoral Care support, and devotional material for patient. Chaplains remain available to provide spiritual support to patient and family as needed and requested. Rev.  Mack Jc

## 2019-11-13 NOTE — PROGRESS NOTES
Extubated this am, tolerating diet, no flatus yet  AF -115  H/H 9/27  Cor RR  Abd soft, minimal incisional tenderness  Ext palpable dp/pt pulses bilaterally  Neuro motor 5/5 both lower extremities    IMP progressing well  PLAN continue ICU monitoring             Possible transfer to floor tomorrow    [address] [facility]  EVMS Asst Prof Surgery  [phone] [facility] [phone]

## 2019-11-13 NOTE — PROGRESS NOTES
Subjective:  62 y.o. male post operative day 2 Status Post L4-S1 ALIF and L1-Pelvis lami fusion with facetectomy at L3. Pt. Awake this morning. Pain well controlled. No O/N events. Labs:  Recent Results (from the past 24 hour(s))   GLUCOSE, POC    Collection Time: 11/12/19 12:03 PM   Result Value Ref Range    Glucose (POC) 111 (H) 70 - 110 mg/dL   CBC WITH AUTOMATED DIFF    Collection Time: 11/13/19  5:24 AM   Result Value Ref Range    WBC 14.4 (H) 4.6 - 13.2 K/uL    RBC 2.56 (L) 4.70 - 5.50 M/uL    HGB 8.1 (L) 13.0 - 16.0 g/dL    HCT 23.8 (L) 36.0 - 48.0 %    MCV 93.0 74.0 - 97.0 FL    MCH 31.6 24.0 - 34.0 PG    MCHC 34.0 31.0 - 37.0 g/dL    RDW 13.9 11.6 - 14.5 %    PLATELET 269 161 - 725 K/uL    MPV 9.3 9.2 - 11.8 FL    NEUTROPHILS 82 (H) 42 - 75 %    LYMPHOCYTES 6 (L) 20 - 51 %    MONOCYTES 12 (H) 2 - 9 %    EOSINOPHILS 0 0 - 5 %    BASOPHILS 0 0 - 3 %    ABS. NEUTROPHILS 11.8 (H) 1.8 - 8.0 K/UL    ABS. LYMPHOCYTES 0.9 0.8 - 3.5 K/UL    ABS. MONOCYTES 1.7 (H) 0 - 1.0 K/UL    ABS. EOSINOPHILS 0.0 0.0 - 0.4 K/UL    ABS.  BASOPHILS 0.0 0.0 - 0.1 K/UL    RBC COMMENTS NORMOCYTIC, NORMOCHROMIC      DF MANUAL         Meds:    Current Facility-Administered Medications:     0.9% sodium chloride infusion 250 mL, 250 mL, IntraVENous, PRN, Daquan Yanez MD    lactated Ringers infusion, 100 mL/hr, IntraVENous, CONTINUOUS, Laurita Casillas MD, Last Rate: 100 mL/hr at 11/13/19 0529, 100 mL/hr at 11/13/19 0529    sodium chloride (NS) flush 5-40 mL, 5-40 mL, IntraVENous, Q8H, Laurita Casillas MD, 10 mL at 11/12/19 2129    sodium chloride (NS) flush 5-40 mL, 5-40 mL, IntraVENous, PRN, Laurita Casillas MD    naloxone [facility]) injection 0.2 mg, 0.2 mg, IntraVENous, PRN, Laurita Casillas MD    glucose chewable tablet 16 g, 4 Tab, Oral, PRN, Laurita Casillas MD    glucagon [facility]) injection 1 mg, 1 mg, IntraMUSCular, PRN, Laurita Casillas MD    dextrose 10% infusion 125-250 mL, 125-250 mL, IntraVENous, PRN, Laurita Casillas MD    fentaNYL citrate (PF) injection 25 mcg, 25 mcg, IntraVENous, PRN, Rory Bates MD    0.9% sodium chloride infusion 250 mL, 250 mL, IntraVENous, PRN, Jono Pepper MD    aspirin tablet 325 mg, 325 mg, Oral, DAILY, Jono Pepper MD, Stopped at 11/12/19 0900    venlafaxine-SR (EFFEXOR-XR) capsule 150 mg, 150 mg, Oral, DAILY, Jono Pepper MD, Stopped at 11/12/19 0900    allopurinol (ZYLOPRIM) tablet 300 mg, 300 mg, Oral, DAILY, Jono Pepper MD, Stopped at 11/12/19 0900    atorvastatin (LIPITOR) tablet 80 mg, 80 mg, Oral, DAILY, Jono Pepper MD, Stopped at 11/12/19 0900    [Held by provider] metoprolol succinate (TOPROL-XL) XL tablet 50 mg, 50 mg, Oral, DAILY, Jono Pepper MD    ferrous sulfate tablet 325 mg, 325 mg, Oral, ACB, Jono Pepper MD, 325 mg at 11/13/19 3586    ascorbic acid (vitamin C) (VITAMIN C) tablet 1,000 mg, 1,000 mg, Oral, DAILY, Jono Pepper MD, Stopped at 11/12/19 0900    sodium chloride (NS) flush 5-40 mL, 5-40 mL, IntraVENous, PRN, Jono Pepper MD    acetaminophen (TYLENOL) tablet 650 mg, 650 mg, Oral, Q4H PRN, Jono Pepper MD    oxyCODONE IR (ROXICODONE) tablet 5 mg, 5 mg, Oral, Q4H, Jono Pepper MD, 5 mg at 11/13/19 1987    morphine injection 2 mg, 2 mg, IntraVENous, Q4H PRN, Jono Pepper MD    naloxone [facility]) injection 0.4 mg, 0.4 mg, IntraVENous, PRN, Jono Pepper MD    ondansetron (ZOFRAN) injection 4 mg, 4 mg, IntraVENous, Q4H PRN, Jono Pepper MD    phenol throat spray (CHLORASEPTIC) 1 Spray, 1 Spray, Oral, PRN, Jono Pepper MD    diphenhydrAMINE (BENADRYL) capsule 25 mg, 25 mg, Oral, Q4H PRN, Jono Pepper MD    pantoprazole (PROTONIX) tablet 40 mg, 40 mg, Oral, DAILY, Jono Pepper MD, Stopped at 11/12/19 0900    cyclobenzaprine (FLEXERIL) tablet 5 mg, 5 mg, Oral, TID PRN, Jono Pepper MD, 5 mg at 11/12/19 1614    docusate sodium (COLACE) capsule 100 mg, 100 mg, Oral, BID, Jono Pepper MD, 100 mg at 11/12/19 1914    fentaNYL citrate (PF) injection  mcg,  mcg, IntraVENous, Q4H PRN, Manny CONTRERAS MD, 100 mcg at 11/11/19 2137    fentaNYL (PF) 900 mcg/30 ml infusion soln, 0-200 mcg/hr, IntraVENous, TITRATE, Manny CONTRERAS MD, Stopped at 11/12/19 0800    midazolam in normal saline (VERSED) 2 mg/mL infusion, 1-3 mg/hr, IntraVENous, TITRATE, Manny CONTRERAS MD, Stopped at 11/12/19 0800    albuterol-ipratropium (DUO-NEB) 2.5 MG-0.5 MG/3 ML, 3 mL, Nebulization, Q6H PRN, Manny CONTRERAS MD    dexamethasone (DECADRON) 4 mg/mL injection 4 mg, 4 mg, IntraVENous, Q8H, Jayce POLLOCK MD, 4 mg at 11/13/19 5236    ELECTROLYTE REPLACEMENT PROTOCOL - Potassium Standard Dosing , 1 Each, Other, PRN, Chelsea Olivares MD    ELECTROLYTE REPLACEMENT PROTOCOL - Magnesium , 1 Each, Other, PRN, Chelsea Olivares MD    ELECTROLYTE REPLACEMENT PROTOCOL - Phosphorus  Standard Dosing, 1 Each, Other, PRN, Manny CONTRERAS MD  Blood Culture:  No components found for: BLOODCX  Wound Culture:  No results found for: WOUNDCULT  Ins and Outs:  No intake/output data recorded. Physical Exam:  Visit Vitals  /59   Pulse 100   Temp 99.2 °F (37.3 °C)   Resp 14   Ht 5' 7\" (1.702 m)   Wt 76.3 kg (168 lb 4.8 oz)   SpO2 99%   BMI 26.36 kg/m²     ? General: Awake, Alert, Oriented  ? Eyes: Pupils equal, round and reactive to light  ? Heart: regular rate and rhythm  ? Lungs: No audible wheezing  ?  Abdomen: soft  Lumbar   Dressing C/D/I   SILT throughout B/L LE   4/5 strength throughout B/L LE    Cap refill less then 3 sec B/L LE        Assessment:    POD 2 From above surgery Doing well    Plan:  Weight bearing as tolerated all extremities  Up and out of bed  DVT prophylaxis- mechanical  Drain maintain drain  Pain control: analgesics  PT/OT  Discharge planning in progress    Vanessa Smyth MD

## 2019-11-13 NOTE — PROGRESS NOTES
3339 -  Head to toe assessment performed at this time. Pt denies any chest pain or SOB. Pt denies any numbness or tingling to extremities. Pt encouraged to manage pain and to use the incentive spirometer. Pt educated on the side effects of medications taken. Pt left with call light within reach and bed in low position. Will continue to monitor. Shift summary - Pt pain managed with prn medication per MAR. Pt informed about all medications and side effects and encouraged to ask questions about medication. Pt encouraged throughout the night to use incentive spirometer and the purpose of the incentive spirometer. Pt left with no signs of distress and any concerns of pt addressed.

## 2019-11-13 NOTE — ROUTINE PROCESS
TRANSFER - IN REPORT: 
 
Verbal report received from Idris Mata RN (name) on Maxx Blair  being received from ICU (unit) for routine progression of care Report consisted of patients Situation, Background, Assessment and  
Recommendations(SBAR). Information from the following report(s) SBAR, Kardex, OR Summary, Procedure Summary, Intake/Output, MAR, Recent Results and Quality Measures was reviewed with the receiving nurse. Opportunity for questions and clarification was provided. Assessment completed upon patients arrival to unit and care assumed.

## 2019-11-13 NOTE — DISCHARGE SUMMARY
SPINE DISCHARGE SUMMARY      Procedure: ALIF L4-S1 with Post lumbar lami decompression and fusion L1-Pelvis and osteotomy at L2/3    HPI:  This is a 62y.o. year old male that presented to the office with signs and symptoms of lumbar radiculopathy and spinal deformity . They tried and failed conservative treatment measures and wished to proceed with surgical intervention. The risks, benefits, and complications of the procedure were discussed with the patient and informed consent was obtained. Hospital Course: The patient was admitted to the hospital on 11/11/2019 and underwent an uncomplicated ALIF U4-J3 with Post lumbar lami decompression and fusion L1-Pelvis and osteotomy at L2/3  . They were transferred to the floor after a brief stay in the post-anesthesia care unit. Their pain was well managed with IV and oral pain medications. They began therapy on post operative day #1. Daily discussion was had with the patient, nursing staff, orthopaedic team, and family members if present. All questions were answered to the patients satisfaction. Patient will be discharge to home today in good condition. Patient is scheduled for followup with me in 2 weeks.

## 2019-11-13 NOTE — PROGRESS NOTES
Patient is transferred to floor earlier this AM. Chart reviewed. Patient since extubation yesterday had remained stable in ICU. Patient on room air, hemodynamically stable  No events overnight. Discharge planning in process per ortho note  Will sign off. Call as needed.     Missy Lopez MD

## 2019-11-13 NOTE — PROGRESS NOTES
1920 Completed shift report and assumed care from Dot Sterling RN. We reviewed SBAR, labs, meds, and plan of care. Witnessed waste of 15ml versed and 8ml fentanyl with Dot Sterling RN.  2015 Completed assessment  0000 Reassessed pt  [phone] Reassessed pt  0400 Clarified transfer orders with Dr. Cosette Kayser, hospitalist  6190 Clarified unit pt is transferring to with Mehul Bishop, nursing supervisor. 3809 Completed report, via telephone, with LENORE Lindsey RN. We reviewed SBAR, labs, meds, and plan of care for pt.  0510 Pt safely transferred to bed 225, ortho unit, nurse present.

## 2019-11-13 NOTE — PROGRESS NOTES
POD #2 ALIF  Out on the floor. Moderate incisional pain. Taking some po. Passing flatus. VSS  Abdomen soft. No ecchymosis. BS (+). Incision clean dry, intact  Extremities: warm, well perfused    Doing well from vascular standpoint  Nothing further to add. Will sign off. Available as needed.

## 2019-11-13 NOTE — PROGRESS NOTES
Problem: Falls - Risk of  Goal: *Absence of Falls  Description  Document [place] Fall Risk and appropriate interventions in the flowsheet.   11/13/2019 0818 by Carolynn Ken RN  Outcome: Progressing Towards Goal  Note:   Fall Risk Interventions:  Mobility Interventions: Patient to call before getting OOB, PT Consult for mobility concerns, PT Consult for assist device competence, Strengthening exercises (ROM-active/passive), Utilize walker, cane, or other assistive device    Mentation Interventions: Adequate sleep, hydration, pain control    Medication Interventions: Patient to call before getting OOB, Teach patient to arise slowly    Elimination Interventions: Call light in reach, Elevated toilet seat           11/13/2019 0817 by Carolynn Ken RN  Outcome: Progressing Towards Goal  Note:   Fall Risk Interventions:  Mobility Interventions: Patient to call before getting OOB, PT Consult for mobility concerns, PT Consult for assist device competence, Strengthening exercises (ROM-active/passive), Utilize walker, cane, or other assistive device    Mentation Interventions: Adequate sleep, hydration, pain control    Medication Interventions: Patient to call before getting OOB, Teach patient to arise slowly    Elimination Interventions: Call light in reach, Elevated toilet seat

## 2019-11-13 NOTE — PROGRESS NOTES
Problem: Mobility Impaired (Adult and Pediatric)  Goal: *Acute Goals and Plan of Care (Insert Text)  Description  Physical Therapy Goals  Initiated 11/12/2019  to be met within 3-4 days  Bed mobility:  Log rolling side to side and supine <> sit S/SBA for OOB activities. Sitting Balance: Tolerate up in chair 30 minutes for ADLs. Transfers:  Sit <> stand with S/SBA in prep for gait. Standing/Ambulation Balance:  Good with RW for safe transfers and gait. Ambulation:  Ambulate 100ft with S/SBA with RW for home mobility at discharge. Stairs: Ascend/Descend 3-5 steps CGA/HR for home re-entry as needed. 11/13/2019 1807 by Chapito Franklin PTA  Outcome: Resolved/Met     PHYSICAL THERAPY TREATMENT/DISCHARGE    Patient: Clarisa Garrido (86 y.o. male)  Date: 11/13/2019  Diagnosis: Scoliosis deformity of spine [M41.9] DDD (degenerative disc disease), lumbar  Procedure(s) (LRB):  ANTERIOR LUMBAR INTERBODY FUSION LUMBAR 4-SACRAL 1, POSTERIOR LUMBAR 1- PELVIS LAMINECTOMY AND FUSION WITH LUMBAR 3 OSTEOTOMY WITH ALLOGRAFT, STRUCTURAL  AND MORSELIZED. WITH NEUROMONITORING WITH C-ARM (N/A) 2 Days Post-Op  Precautions: Back, Fall  Chart, physical therapy assessment, plan of care and goals were reviewed. ASSESSMENT:  Pt meets needs for safe home mobility, expresses understanding of body mechanics and is cleared from this level of PT. Pt's LE strength is fair and coordination. Progression toward goals:  ?      Goals met  ? Improving appropriately and progressing toward goals  ? Improving slowly and progressing toward goals  ? Not making progress toward goals and plan of care will be adjusted     PLAN:  Patient will be discharged from physical therapy at this time. Rationale for discharge:  ? Goals Achieved  ? Plateau Reached  ? Patient not participating in therapy  ?  Other:  Discharge Recommendations:  Home Health  Further Equipment Recommendations for Discharge:  rolling walker     SUBJECTIVE: Patient stated I'm not getting .     OBJECTIVE DATA SUMMARY:   Critical Behavior:  Neurologic State: Alert, Appropriate for age  Orientation Level: Appropriate for age, Oriented X4  Cognition: Appropriate decision making, Appropriate for age attention/concentration, Appropriate safety awareness, Follows commands  Safety/Judgement: Awareness of environment, Fall prevention, Insight into deficits  Functional Mobility Training:  Bed Mobility:  Rolling: Supervision  Supine to Sit: Supervision  Sit to Supine: Supervision  Scooting: Supervision  Transfers:  Sit to Stand: Supervision  Stand to Sit: Supervision  Bed to Chair: Supervision  Balance:  Sitting: Intact  Standing: Intact; With support  Standing - Static: Good  Standing - Dynamic : Fair  Ambulation/Gait Training:  Distance (ft): 250 Feet (ft)  Assistive Device: Walker, rolling  Ambulation - Level of Assistance: Supervision  Gait Abnormalities: Antalgic;Decreased step clearance  Speed/Yuliya: Pace decreased (<100 feet/min)  Step Length: Right shortened;Left shortened  Interventions: Safety awareness training; Tactile cues; Visual/Demos; Verbal cues  Stairs:  Number of Stairs Trained: 6  Stairs - Level of Assistance: Supervision   Rail Use: Right   Pain:  Pain Scale 1: Numeric (0 - 10)  Pain Intensity 1: 6  Pain Location 1: Back  Pain Orientation 1: Lower  Pain Description 1: Aching  Pain Intervention(s) 1: Medication (see MAR)  Activity Tolerance:   Good  Please refer to the flowsheet for vital signs taken during this treatment. After treatment:   ? Patient left in no apparent distress sitting up in chair  ? Patient left in no apparent distress in bed  ? Call bell left within reach  ? Nursing notified  ? Caregiver present  ?  Bed alarm activated  Praveen Duran PTA   Time Calculation: 25 mins

## 2019-11-13 NOTE — PROGRESS NOTES
0750-Head to toe assess performed at this time, see flow sheet. 0945-Removed 1350mL clear yellow urine from coleman cathter and 40mL serosanguinous drainage from hemovac. 1054-Patient reports he is now passing gas. 1057-PT in room with patient at this time. 1106-Patient ambulating in hallway using walker and gait belt with PT. \    1115-PT reports that PT is signing off on patient as he has met all measures. 1150-This nurse noted MEWS 3 due to HR of 113   when CNA took vitals, reassessed and patient has MEWS of 1 with HR of 98.    1200-Dr. Delgadillo called; this nurse to call Dr. Lupe Bella when patient urinates, per Dr. Sharda Taylor. Once he urinates he will have met everything to discharge. 1230-Patient reports pain 3/10 to lower back. Patient declines PRN pain medications at this time. 1315-Patient urinated 350 mL clear yellow urine. Placed call to Dr. Sharda Taylor to report 350 mL urination. [id number] instructions reviewed with patient at this time. Opportunity for questions and clarification was provided. Patient has verbalized understanding. Patient was provided with care notes to include side effects of RX's. Arm bands removed and shredded. AVS reviewed with Jessi Pride RN. IV removed. Dressing CDI.

## 2019-11-13 NOTE — PROGRESS NOTES
OCCUPATIONAL THERAPY EVALUATION/DISCHARGE    Patient: Hailey Winter (71 y.o. male)  Date: 11/13/2019  Primary Diagnosis: Scoliosis deformity of spine [M41.9]  Procedure(s) (LRB):  ANTERIOR LUMBAR INTERBODY FUSION LUMBAR 4-SACRAL 1, POSTERIOR LUMBAR 1- PELVIS LAMINECTOMY AND FUSION WITH LUMBAR 3 OSTEOTOMY WITH ALLOGRAFT, STRUCTURAL  AND MORSELIZED. WITH NEUROMONITORING WITH C-ARM (N/A) 2 Days Post-Op   Precautions: LLQ anterior incision,  Back, Fall  PLOF: Patient reports grossly mod I. Pt reports he is a  and enjoys fixing things around the house, but increased standing time and distance had become difficult    ASSESSMENT AND RECOMMENDATIONS:  Based on the objective data described below, the patient presents with BLE decreased ROM and strength affecting LE ADLs. Patient able to utilize BLE c-sitting technique for BLE for sock donning, but noted to be holding breath. Educated on importance of body mechanics to protect surgical sites. Patient reports spouse will be available to assist w/ ADLs. Education: Reviewed Back Precautions, body mechanics, home safety, adaptive strategies and adaptive dressing techniques including clothing modifications with patient verbalizing understanding at this time. Skilled Occupational Therapy is not indicated at this time in this setting. Patient should continue to improve as pain and ROM/strength improves. Discharge Recommendations: Home Health  Further Equipment Recommendations for Discharge: To Be Determined (TBD) at next level of care (may need shower chair or Tub Transfer bench)       SUBJECTIVE:   Patient stated I was not able to do a lot because of my back.     OBJECTIVE DATA SUMMARY:     Past Medical History:   Diagnosis Date    Arthritis     Valencia's esophagus     Chronic pain     intermittent lumbar x 5 years    Diverticulitis     GERD (gastroesophageal reflux disease)     Hypertension     Ill-defined condition     gout    Ill-defined condition     high cholesterol    Ill-defined condition     anxiety    Stroke [facility]) 2014     Past Surgical History:   Procedure Laterality Date    HX GI      colon resection, diverticulitis    HX HERNIA REPAIR      Incisional    HX HERNIA REPAIR  1968    Ingunial     Barriers to Learning/Limitations: None  Compensate with: visual, verbal, tactile, kinesthetic cues/model    Home Situation/Prior Level of Function:   Home Situation  Home Environment: Private residence  # Steps to Enter: 4  Rails to Enter: Yes  Hand Rails : Bilateral  One/Two Story Residence: One story  Living Alone: No  Support Systems: Spouse/Significant Other/Partner  Patient Expects to be Discharged to[de-identified] Private residence  Current DME Used/Available at Home: Walker, rolling, Other (comment)(walking stick)  Tub or Shower Type: Tub/Shower combination  ? Right hand dominant   ? Left hand dominant    Cognitive/Behavioral Status:  Neurologic State: Alert; Appropriate for age  Orientation Level: Appropriate for age;Oriented X4  Cognition: Appropriate decision making; Appropriate for age attention/concentration; Appropriate safety awareness; Follows commands  Safety/Judgement: Awareness of environment; Fall prevention; Insight into deficits    Skin: LLQ abdominal & lower back incision w/ dressing   Edema: No significant edema noted     Vision/Perceptual:   Appears WFL    Coordination: BUE  Coordination: Within functional limits  Fine Motor Skills-Upper: Left Intact; Right Intact    Gross Motor Skills-Upper: Left Intact; Right Intact    Balance:  Sitting: Intact    Strength: BUE  Strength: Generally decreased, functional    Tone & Sensation:BUE  Tone: Normal  Sensation: Intact    Range of Motion: BUE  AROM: Generally decreased, functional    Functional Mobility and Transfers for ADLs:  Bed Mobility:  Rolling: Supervision  Supine to Sit: Supervision  Sit to Supine: Supervision  Scooting: Supervision  Transfers:  Sit to Stand: Supervision; Adaptive equipment  Bed to Chair: Supervision; Adaptive equipment    ADL Assessment:  Feeding: Independent    Oral Facial Hygiene/Grooming: Supervision(standing sinkside)    Bathing: Minimum assistance; Additional time    Upper Body Dressing: Setup    Lower Body Dressing: Moderate assistance    Toileting: Supervision    ADL Intervention:  Feeding  Drink to Mouth: Independent    Grooming  Washing Hands: Supervision    Lower Body Dressing Assistance  Socks: Contact guard assistance;Minimum assistance(c-sitting; but pt holding breath)    Toileting  Bladder Hygiene: (coleman just pulled)    Cognitive Retraining  Problem Solving: Inductive reason; Identifying the task; Identifying the problem;General alternative solution;Deductive reason; Awareness of environment  Safety/Judgement: Awareness of environment; Fall prevention; Insight into deficits    Pain:  Pain level pre-treatment: 2/10  Pain level post-treatment: 2/10  Pain Intervention(s): Medication administer by Nursing (see MAR); Rest, Ice, Repositioning   Response to intervention: Nurse notified, see doc flow sheet    Activity Tolerance:   Fair. Patient able to stand 2-3 minute(s). Patient able to complete ADLs with intermittent rest breaks. Patient limited by ROM, strength. Please refer to the flowsheet for vital signs taken during this treatment. After treatment:   ?  Patient left in no apparent distress sitting up in chair  ? Patient sitting on EOB  ? Patient left in no apparent distress in bed  ? Call bell left within reach  ? Nursing notified/Jennifer  ? Caregiver present/Salomon/CNA  ? Ice applied  ? SCD's on while back in bed  ? Bed alarm activated    COMMUNICATION/EDUCATION:   Communication/Collaboration:  ?       Role of Occupational Therapy in the acute care setting. ? Home safety education was provided and the patient/caregiver indicated understanding. ? Patient/family have participated as able in goal setting and plan of care.   ?      Patient/family agree to work toward stated goals and plan of care. ?      Patient understands intent and goals of therapy, but is neutral about his/her participation. ? Patient is unable to participate in plan of care at this time. Thank you for this referral.  Jackeline Bunn, OTR/L, CSRS  Time Calculation: 17 mins    Eval Complexity: History: HIGH Complexity : Extensive review of history including physical, cognitive and psychosocial history ; Examination: MEDIUM Complexity : 3-5 performance deficits relating to physical, cognitive , or psychosocial skils that result in activity limitations and / or participation restrictions; Decision Making:MEDIUM Complexity : Patient may present with comorbidities that affect occupational performnce.  Miniml to moderate modification of tasks or assistance (eg, physical or verbal ) with assesment(s) is necessary to enable patient to complete evaluation

## 2019-11-13 NOTE — PROGRESS NOTES
Transition of Care (ERNESTO) Plan:    Chart reviewed, met with pt in room. Pt planning discharge home, lives with wife, has RW available for use at home. Spoke with Travis Busby at MD office [phone] who states pt may have [facility] and she will contact [place] [phone] [address] [facility] [phone] for follow up. Met with pt again, FOC offered, pt agreeable to [facility] , referral placed. [facility] liaison will follow up with MD office for orders. No other needs at this time, CM remains available. ERNESTO Transportation:   How is patient being transported at discharge? Family/Friend      When? Once cleared by Therapy between 12-2pm     Is transport scheduled? N/A      Follow-up appointment and transportation:   PCP/Specialist?  See AVS for Appointment         Who is transporting to the follow-up appointment? Family/Friend      Is transport for follow up appointment scheduled? N/A    Communication plan (with patient/family): Who is being called? Patient or Next of Kin? Responsible party? Patient      What number(s) is to be used? See Facesheet      What service provider is calling for [facility] services? When are they calling? 24-48 hours following discharge    Readmission Risk? (Green/Low; Yellow/Moderate; Red/High):  Green    Care Management Interventions  PCP Verified by CM:  Yes  Palliative Care Criteria Met (RRAT>21 & CHF Dx)?: No  Transition of Care Consult (CM Consult): [address]: No  Reason Outside [place]: Physician referred to specific agency([facility])  Discharge Durable Medical Equipment: No  Physical Therapy Consult: Yes  Occupational Therapy Consult: Yes  Current Support Network: Lives with Spouse  Confirm Follow Up Transport: Family  Plan discussed with Pt/Family/Caregiver: Yes  Freedom of Choice Offered: Yes  Discharge Location  Discharge Placement: Home with home health

## 2019-11-16 LAB
ABO + RH BLD: NORMAL
BLD PROD TYP BPU: NORMAL
BLD PROD TYP BPU: NORMAL
BLOOD GROUP ANTIBODIES SERPL: NORMAL
BPU ID: NORMAL
BPU ID: NORMAL
CROSSMATCH RESULT,%XM: NORMAL
CROSSMATCH RESULT,%XM: NORMAL
SPECIMEN EXP DATE BLD: NORMAL
STATUS OF UNIT,%ST: NORMAL
STATUS OF UNIT,%ST: NORMAL
UNIT DIVISION, %UDIV: 0
UNIT DIVISION, %UDIV: 0

## 2019-11-16 NOTE — OP NOTES
[facility] [place]  OPERATIVE REPORT    Name:  Theresa Villalta  MR#:   [id number]  :  1965  ACCOUNT #:  [de-identified]  DATE OF SERVICE:  2019    PREOPERATIVE DIAGNOSIS:  Intervertebral disk disorders with radiculopathy, lumbar region, acquired deformities of spine. POSTOPERATIVE DIAGNOSIS:  Intervertebral disk disorders with radiculopathy, lumbar region, acquired deformities of spine. PROCEDURE PERFORMED:  Anterior lumbar interbody fusion of lumbar 4 to sacral 1. SURGEON:  Dr. Hilton Rojas. CO-SURGEON:  Lety Mosquera MD    ASSISTANT:  Marc Dorsey PA-C. There were no residents or fellows available for the procedure. ANESTHESIA:  General endotracheal tube. COMPLICATIONS:  None. SPECIMENS REMOVED:  See Dr Dana Dumont note    IMPLANTS:  See Dr Dana Dumont note    ESTIMATED BLOOD LOSS:  Approximately 100 mL. INDICATIONS FOR PROCEDURE:  The patient is a 25-year-old gentleman who suffers from severe chronic back and leg pain from his spinal deformity. I have been asked by Dr. Brian Wisdom to provide exposure of the L4-L5 disk space and L5-S1 disk space from an anterior approach. OPERATIVE FINDINGS:  The L5-S1 interspace was exposed by splaying up the aortic bifurcation and the confluence of the inferior vena cava. The L4-L5 disk space was exposed by retracting the left common iliac artery, left common iliac vein, abdominal aorta, and inferior vena cava to the patient's right side. For the details of the interbody fusion, please see Dr. Dana Dumont note. PROCEDURE:  Having taken prior consent, the patient was brought to the operating room and was placed in supine position. He was prepped and draped in sterile fashion. An appropriate time-out with universal protocol was performed.   We first turned our attention to the left lower quadrant and made a paramedian incision approximately 3 cm off the midline from approximately two fingerbreadths cephalad to the pubis to a fingerbreadth inferior to the level of the umbilicus. Dissection was carried down through the skin and subcutaneous tissue to the anterior rectus sheath. This was incised longitudinally. The rectus abdominis muscle was then retracted medially. The transversalis fascia was incised and then, the retroperitoneum was entered. The peritoneal contents were swept to the patient's right side. There was some inflammatory response in the retroperitoneum, which did make this dissection somewhat challenging. The patient had a prior episode of diverticulitis. Nonetheless, we dissected down to the common iliac artery on the left side and dissected along its course. The common iliac vein was then dissected just medial to it and mobilized. The middle sacral vein was doubly ligated and divided. The other small venous tributaries were also doubly ligated and divided, and there was a moderate inflammatory response over the L5-S1 disk space. We then placed a marking needle in the L5-S1 disk space and confirmed with x-ray that this was the appropriate level. Diskectomy was then performed by Dr. Charisse Harper. Please see his detailed note. Once this was performed, meticulous hemostasis was obtained in the retroperitoneum. We then basically mobilized the abdominal aorta to its left side as well as the common iliac artery on the left side as well as the common iliac vein and the inferior vena cava. The iliolumbar vein was circumferentially dissected and ligated as were several other tributaries of the inferior vena cava allowing rather wide mobilization of the aorta and the inferior vena cava. It should be noted that the common iliac artery on the left and the right was essentially free of disease as was the abdominal aorta. It should be noted that the retractors had been placed initially for the L5-S1 discectomy and interbody fusion and then similarly the retractor was replaced providing exposure of the L4-L5 disk space. Once again, interbody fusion was performed by Dr. Sharyle Mosher. See his detailed note. Following this, meticulous hemostasis was obtained in retroperitoneum. There was a single peritoneal rent, and this was closed with 3-0 Prolene suture. Great care was made to leave the sympathetic chain intact. The retroperitoneum was copiously irrigated and was hemostatic. We then closed the anterior rectus sheath in running continuous fashion with #1 Prolene suture. The subcutaneous tissue, i.e., Albaro's layer was closed with Vicryl suture and the skin was closed in running subcuticular fashion with Monocryl suture and Dermabond was applied to the wounds. Dr. Sharyle Mosher performed the remainder of the procedure with posterior approach.       Maggi Friedman MD      [id number]  D:  11/15/2019 11:43  T:  11/15/2019 19:13  JOB #:  [id number]

## 2020-02-13 ENCOUNTER — HOSPITAL ENCOUNTER (OUTPATIENT)
Dept: PREADMISSION TESTING | Age: 55
Discharge: HOME | End: 2020-02-13
Payer: COMMERCIAL

## 2020-02-13 DIAGNOSIS — M51.16 LUMBAR DISC PROLAPSE WITH ROOT COMPRESSION: ICD-10-CM

## 2020-02-13 LAB
ABO + RH BLD: NORMAL
ALBUMIN SERPL-MCNC: 4.2 G/DL (ref 3.4–5)
ALBUMIN/GLOB SERPL: 1.2 {RATIO} (ref 0.8–1.7)
ALP SERPL-CCNC: 109 U/L (ref 45–117)
ALT SERPL-CCNC: 30 U/L (ref 16–61)
ANION GAP SERPL CALC-SCNC: 2 MMOL/L (ref 3–18)
APTT PPP: 35.2 SEC (ref 23–36.4)
AST SERPL-CCNC: 23 U/L (ref 10–38)
ATRIAL RATE: 75 BPM
BASOPHILS # BLD: 0 K/UL (ref 0–0.1)
BASOPHILS NFR BLD: 1 % (ref 0–2)
BILIRUB SERPL-MCNC: 0.3 MG/DL (ref 0.2–1)
BLOOD GROUP ANTIBODIES SERPL: NORMAL
BUN SERPL-MCNC: 14 MG/DL (ref 7–18)
BUN/CREAT SERPL: 16 (ref 12–20)
CALCIUM SERPL-MCNC: 9.6 MG/DL (ref 8.5–10.1)
CALCULATED P AXIS, ECG09: 52 DEGREES
CALCULATED R AXIS, ECG10: 19 DEGREES
CALCULATED T AXIS, ECG11: 33 DEGREES
CHLORIDE SERPL-SCNC: 102 MMOL/L (ref 100–111)
CO2 SERPL-SCNC: 33 MMOL/L (ref 21–32)
CREAT SERPL-MCNC: 0.9 MG/DL (ref 0.6–1.3)
DIAGNOSIS, 93000: NORMAL
DIFFERENTIAL METHOD BLD: NORMAL
EOSINOPHIL # BLD: 0.3 K/UL (ref 0–0.4)
EOSINOPHIL NFR BLD: 4 % (ref 0–5)
ERYTHROCYTE [DISTWIDTH] IN BLOOD BY AUTOMATED COUNT: 14.3 % (ref 11.6–14.5)
EST. AVERAGE GLUCOSE BLD GHB EST-MCNC: 143 MG/DL
GLOBULIN SER CALC-MCNC: 3.4 G/DL (ref 2–4)
GLUCOSE SERPL-MCNC: 95 MG/DL (ref 74–99)
HBA1C MFR BLD: 6.6 % (ref 4.2–5.6)
HCT VFR BLD AUTO: 45.9 % (ref 36–48)
HGB BLD-MCNC: 14.9 G/DL (ref 13–16)
INR PPP: 1 (ref 0.8–1.2)
LYMPHOCYTES # BLD: 2.5 K/UL (ref 0.9–3.6)
LYMPHOCYTES NFR BLD: 31 % (ref 21–52)
MCH RBC QN AUTO: 29.6 PG (ref 24–34)
MCHC RBC AUTO-ENTMCNC: 32.5 G/DL (ref 31–37)
MCV RBC AUTO: 91.3 FL (ref 74–97)
MONOCYTES # BLD: 0.8 K/UL (ref 0.05–1.2)
MONOCYTES NFR BLD: 10 % (ref 3–10)
NEUTS SEG # BLD: 4.3 K/UL (ref 1.8–8)
NEUTS SEG NFR BLD: 54 % (ref 40–73)
P-R INTERVAL, ECG05: 140 MS
PLATELET # BLD AUTO: 284 K/UL (ref 135–420)
PMV BLD AUTO: 9.4 FL (ref 9.2–11.8)
POTASSIUM SERPL-SCNC: 4.9 MMOL/L (ref 3.5–5.5)
PROT SERPL-MCNC: 7.6 G/DL (ref 6.4–8.2)
PROTHROMBIN TIME: 12.8 SEC (ref 11.5–15.2)
Q-T INTERVAL, ECG07: 354 MS
QRS DURATION, ECG06: 86 MS
QTC CALCULATION (BEZET), ECG08: 395 MS
RBC # BLD AUTO: 5.03 M/UL (ref 4.7–5.5)
SODIUM SERPL-SCNC: 137 MMOL/L (ref 136–145)
SPECIMEN EXP DATE BLD: NORMAL
VENTRICULAR RATE, ECG03: 75 BPM
WBC # BLD AUTO: 7.9 K/UL (ref 4.6–13.2)

## 2020-02-13 PROCEDURE — 83036 HEMOGLOBIN GLYCOSYLATED A1C: CPT

## 2020-02-13 PROCEDURE — 85730 THROMBOPLASTIN TIME PARTIAL: CPT

## 2020-02-13 PROCEDURE — 93005 ELECTROCARDIOGRAM TRACING: CPT

## 2020-02-13 PROCEDURE — 85610 PROTHROMBIN TIME: CPT

## 2020-02-13 PROCEDURE — 86900 BLOOD TYPING SEROLOGIC ABO: CPT

## 2020-02-13 PROCEDURE — 36415 COLL VENOUS BLD VENIPUNCTURE: CPT

## 2020-02-13 PROCEDURE — 80053 COMPREHEN METABOLIC PANEL: CPT

## 2020-02-13 PROCEDURE — 85025 COMPLETE CBC W/AUTO DIFF WBC: CPT

## 2020-02-14 ENCOUNTER — ANESTHESIA EVENT (OUTPATIENT)
Dept: SURGERY | Age: 55
DRG: 460 | End: 2020-02-14
Payer: COMMERCIAL

## 2020-02-14 LAB
BACTERIA SPEC CULT: NORMAL
BACTERIA SPEC CULT: NORMAL
SERVICE CMNT-IMP: NORMAL

## 2020-02-17 ENCOUNTER — HOSPITAL ENCOUNTER (INPATIENT)
Age: 55
LOS: 1 days | Discharge: HOME | DRG: 460 | End: 2020-02-18
Attending: ORTHOPAEDIC SURGERY | Admitting: ORTHOPAEDIC SURGERY
Payer: COMMERCIAL

## 2020-02-17 ENCOUNTER — APPOINTMENT (OUTPATIENT)
Dept: GENERAL RADIOLOGY | Age: 55
DRG: 460 | End: 2020-02-17
Attending: ORTHOPAEDIC SURGERY
Payer: COMMERCIAL

## 2020-02-17 ENCOUNTER — ANESTHESIA (OUTPATIENT)
Dept: SURGERY | Age: 55
DRG: 460 | End: 2020-02-17
Payer: COMMERCIAL

## 2020-02-17 DIAGNOSIS — S22.088A OTHER CLOSED FRACTURE OF TWELFTH THORACIC VERTEBRA, INITIAL ENCOUNTER (HCC): Primary | ICD-10-CM

## 2020-02-17 PROBLEM — S22.089A: Status: ACTIVE | Noted: 2020-02-17

## 2020-02-17 LAB — GLUCOSE BLD STRIP.AUTO-MCNC: 109 MG/DL (ref 70–110)

## 2020-02-17 PROCEDURE — C1713 ANCHOR/SCREW BN/BN,TIS/BN: HCPCS | Performed by: ORTHOPAEDIC SURGERY

## 2020-02-17 PROCEDURE — 77030040609 HC GRFT BN MATR VIVIGEN FORMBL LIFV -K1: Performed by: ORTHOPAEDIC SURGERY

## 2020-02-17 PROCEDURE — 77030008477 HC STYL SATN SLP COVD -A: Performed by: ORTHOPAEDIC SURGERY

## 2020-02-17 PROCEDURE — 82962 GLUCOSE BLOOD TEST: CPT

## 2020-02-17 PROCEDURE — 74011000250 HC RX REV CODE- 250: Performed by: ORTHOPAEDIC SURGERY

## 2020-02-17 PROCEDURE — 77030018673: Performed by: ORTHOPAEDIC SURGERY

## 2020-02-17 PROCEDURE — 65270000029 HC RM PRIVATE

## 2020-02-17 PROCEDURE — 74011000258 HC RX REV CODE- 258: Performed by: NURSE ANESTHETIST, CERTIFIED REGISTERED

## 2020-02-17 PROCEDURE — 74011000250 HC RX REV CODE- 250: Performed by: NURSE ANESTHETIST, CERTIFIED REGISTERED

## 2020-02-17 PROCEDURE — 0RGA0K1 FUSION OF THORACOLUMBAR VERTEBRAL JOINT WITH NONAUTOLOGOUS TISSUE SUBSTITUTE, POSTERIOR APPROACH, POSTERIOR COLUMN, OPEN APPROACH: ICD-10-PCS | Performed by: ORTHOPAEDIC SURGERY

## 2020-02-17 PROCEDURE — 77030038552 HC DRN WND MDII -A: Performed by: ORTHOPAEDIC SURGERY

## 2020-02-17 PROCEDURE — 77030012890

## 2020-02-17 PROCEDURE — 74011250637 HC RX REV CODE- 250/637: Performed by: ORTHOPAEDIC SURGERY

## 2020-02-17 PROCEDURE — 97116 GAIT TRAINING THERAPY: CPT

## 2020-02-17 PROCEDURE — 77030029397 HC SHTH SBS BPLR SEALR MEDT -F: Performed by: ORTHOPAEDIC SURGERY

## 2020-02-17 PROCEDURE — 0RG70K1 FUSION OF 2 TO 7 THORACIC VERTEBRAL JOINTS WITH NONAUTOLOGOUS TISSUE SUBSTITUTE, POSTERIOR APPROACH, POSTERIOR COLUMN, OPEN APPROACH: ICD-10-PCS | Performed by: ORTHOPAEDIC SURGERY

## 2020-02-17 PROCEDURE — 74011250636 HC RX REV CODE- 250/636: Performed by: ORTHOPAEDIC SURGERY

## 2020-02-17 PROCEDURE — 77030004435 HC BUR RND STRY -C: Performed by: ORTHOPAEDIC SURGERY

## 2020-02-17 PROCEDURE — 97161 PT EVAL LOW COMPLEX 20 MIN: CPT

## 2020-02-17 PROCEDURE — 77030020407 HC IV BLD WRMR ST 3M -A: Performed by: ANESTHESIOLOGY

## 2020-02-17 PROCEDURE — 99218 HC RM OBSERVATION: CPT

## 2020-02-17 PROCEDURE — 77030008683 HC TU ET CUF COVD -A: Performed by: ANESTHESIOLOGY

## 2020-02-17 PROCEDURE — 77030011264 HC ELECTRD BLD EXT COVD -A: Performed by: ORTHOPAEDIC SURGERY

## 2020-02-17 PROCEDURE — 77030014008 HC SPNG HEMSTAT J&J -C: Performed by: ORTHOPAEDIC SURGERY

## 2020-02-17 PROCEDURE — 77030039972 HC GRFT BN SUB PRIME HD MUSC -G1: Performed by: ORTHOPAEDIC SURGERY

## 2020-02-17 PROCEDURE — 0PS404Z REPOSITION THORACIC VERTEBRA WITH INTERNAL FIXATION DEVICE, OPEN APPROACH: ICD-10-PCS | Performed by: ORTHOPAEDIC SURGERY

## 2020-02-17 PROCEDURE — 77030011265 HC ELECTRD BLD HEX COVD -A: Performed by: ORTHOPAEDIC SURGERY

## 2020-02-17 PROCEDURE — 77030040361 HC SLV COMPR DVT MDII -B: Performed by: ORTHOPAEDIC SURGERY

## 2020-02-17 PROCEDURE — 77030020010 HC FCPS BPLR DISP INLC -E: Performed by: ORTHOPAEDIC SURGERY

## 2020-02-17 PROCEDURE — 77030008462 HC STPLR SKN PROX J&J -A: Performed by: ORTHOPAEDIC SURGERY

## 2020-02-17 PROCEDURE — 77030006643: Performed by: ANESTHESIOLOGY

## 2020-02-17 PROCEDURE — 77030040830 HC CATH URETH FOL MDII -A: Performed by: ORTHOPAEDIC SURGERY

## 2020-02-17 PROCEDURE — 76010000132 HC OR TIME 2.5 TO 3 HR: Performed by: ORTHOPAEDIC SURGERY

## 2020-02-17 PROCEDURE — 77030014007 HC SPNG HEMSTAT J&J -B: Performed by: ORTHOPAEDIC SURGERY

## 2020-02-17 PROCEDURE — 74011250637 HC RX REV CODE- 250/637: Performed by: ANESTHESIOLOGY

## 2020-02-17 PROCEDURE — 77030008477 HC STYL SATN SLP COVD -A: Performed by: ANESTHESIOLOGY

## 2020-02-17 PROCEDURE — 77030002916 HC SUT ETHLN J&J -A: Performed by: ORTHOPAEDIC SURGERY

## 2020-02-17 PROCEDURE — 77030034479 HC ADH SKN CLSR PRINEO J&J -B: Performed by: ORTHOPAEDIC SURGERY

## 2020-02-17 PROCEDURE — 74011250636 HC RX REV CODE- 250/636: Performed by: NURSE ANESTHETIST, CERTIFIED REGISTERED

## 2020-02-17 PROCEDURE — 77030020782 HC GWN BAIR PAWS FLX 3M -B: Performed by: ORTHOPAEDIC SURGERY

## 2020-02-17 PROCEDURE — 76060000036 HC ANESTHESIA 2.5 TO 3 HR: Performed by: ORTHOPAEDIC SURGERY

## 2020-02-17 PROCEDURE — 74011250636 HC RX REV CODE- 250/636: Performed by: ANESTHESIOLOGY

## 2020-02-17 PROCEDURE — 77030016661 HC BUR RND1 STRY -C: Performed by: ORTHOPAEDIC SURGERY

## 2020-02-17 PROCEDURE — 77030018836 HC SOL IRR NACL ICUM -A: Performed by: ORTHOPAEDIC SURGERY

## 2020-02-17 PROCEDURE — 74011000258 HC RX REV CODE- 258: Performed by: ORTHOPAEDIC SURGERY

## 2020-02-17 PROCEDURE — 74011000250 HC RX REV CODE- 250

## 2020-02-17 PROCEDURE — 76210000016 HC OR PH I REC 1 TO 1.5 HR: Performed by: ORTHOPAEDIC SURGERY

## 2020-02-17 PROCEDURE — 77030010784 HC BOWL MX BN MEDT -C: Performed by: ORTHOPAEDIC SURGERY

## 2020-02-17 PROCEDURE — 77030003029 HC SUT VCRL J&J -B: Performed by: ORTHOPAEDIC SURGERY

## 2020-02-17 PROCEDURE — 77030031139 HC SUT VCRL2 J&J -A: Performed by: ORTHOPAEDIC SURGERY

## 2020-02-17 PROCEDURE — 77030003666 HC NDL SPINAL BD -A: Performed by: ORTHOPAEDIC SURGERY

## 2020-02-17 PROCEDURE — 77030034475 HC MISC IMPL SPN: Performed by: ORTHOPAEDIC SURGERY

## 2020-02-17 PROCEDURE — 77030016670 HC BUR RND3 STRY -B: Performed by: ORTHOPAEDIC SURGERY

## 2020-02-17 PROCEDURE — 77010033678 HC OXYGEN DAILY

## 2020-02-17 PROCEDURE — 77030014647 HC SEAL FBRN TISSL BAXT -D: Performed by: ORTHOPAEDIC SURGERY

## 2020-02-17 DEVICE — IMPLANTABLE DEVICE: Type: IMPLANTABLE DEVICE | Site: SPINE LUMBAR | Status: FUNCTIONAL

## 2020-02-17 DEVICE — ROD SPNL L480MM DIA5.5MM TI STR HEX END EXPEDIUM: Type: IMPLANTABLE DEVICE | Site: SPINE LUMBAR | Status: FUNCTIONAL

## 2020-02-17 DEVICE — SET SCR SPNL L25MM DIA5.5MM TI FOR 5.5MM ROD EXPEDIUM: Type: IMPLANTABLE DEVICE | Site: SPINE LUMBAR | Status: FUNCTIONAL

## 2020-02-17 RX ORDER — LOSARTAN POTASSIUM 25 MG/1
25 TABLET ORAL DAILY
Status: DISCONTINUED | OUTPATIENT
Start: 2020-02-18 | End: 2020-02-18 | Stop reason: HOSPADM

## 2020-02-17 RX ORDER — FENTANYL CITRATE 50 UG/ML
INJECTION, SOLUTION INTRAMUSCULAR; INTRAVENOUS AS NEEDED
Status: DISCONTINUED | OUTPATIENT
Start: 2020-02-17 | End: 2020-02-17 | Stop reason: HOSPADM

## 2020-02-17 RX ORDER — DEXAMETHASONE SODIUM PHOSPHATE 4 MG/ML
INJECTION, SOLUTION INTRA-ARTICULAR; INTRALESIONAL; INTRAMUSCULAR; INTRAVENOUS; SOFT TISSUE AS NEEDED
Status: DISCONTINUED | OUTPATIENT
Start: 2020-02-17 | End: 2020-02-17 | Stop reason: HOSPADM

## 2020-02-17 RX ORDER — DEXTROSE MONOHYDRATE 100 MG/ML
125-250 INJECTION, SOLUTION INTRAVENOUS AS NEEDED
Status: DISCONTINUED | OUTPATIENT
Start: 2020-02-17 | End: 2020-02-17 | Stop reason: HOSPADM

## 2020-02-17 RX ORDER — CEFAZOLIN SODIUM 2 G/50ML
2 SOLUTION INTRAVENOUS ONCE
Status: COMPLETED | OUTPATIENT
Start: 2020-02-17 | End: 2020-02-17

## 2020-02-17 RX ORDER — FENTANYL CITRATE 50 UG/ML
50 INJECTION, SOLUTION INTRAMUSCULAR; INTRAVENOUS AS NEEDED
Status: DISCONTINUED | OUTPATIENT
Start: 2020-02-17 | End: 2020-02-17 | Stop reason: HOSPADM

## 2020-02-17 RX ORDER — NALOXONE HYDROCHLORIDE 0.4 MG/ML
0.1 INJECTION, SOLUTION INTRAMUSCULAR; INTRAVENOUS; SUBCUTANEOUS AS NEEDED
Status: DISCONTINUED | OUTPATIENT
Start: 2020-02-17 | End: 2020-02-17 | Stop reason: HOSPADM

## 2020-02-17 RX ORDER — SODIUM CHLORIDE 0.9 % (FLUSH) 0.9 %
5-40 SYRINGE (ML) INJECTION AS NEEDED
Status: DISCONTINUED | OUTPATIENT
Start: 2020-02-17 | End: 2020-02-18 | Stop reason: HOSPADM

## 2020-02-17 RX ORDER — RANITIDINE 150 MG/1
300 TABLET, FILM COATED ORAL DAILY
Status: DISCONTINUED | OUTPATIENT
Start: 2020-02-18 | End: 2020-02-17

## 2020-02-17 RX ORDER — SUCCINYLCHOLINE CHLORIDE 100 MG/5ML
SYRINGE (ML) INTRAVENOUS AS NEEDED
Status: DISCONTINUED | OUTPATIENT
Start: 2020-02-17 | End: 2020-02-17 | Stop reason: HOSPADM

## 2020-02-17 RX ORDER — KETAMINE HYDROCHLORIDE 10 MG/ML
INJECTION, SOLUTION INTRAMUSCULAR; INTRAVENOUS AS NEEDED
Status: DISCONTINUED | OUTPATIENT
Start: 2020-02-17 | End: 2020-02-17 | Stop reason: HOSPADM

## 2020-02-17 RX ORDER — METOPROLOL SUCCINATE 50 MG/1
50 TABLET, EXTENDED RELEASE ORAL DAILY
Status: DISCONTINUED | OUTPATIENT
Start: 2020-02-18 | End: 2020-02-18 | Stop reason: HOSPADM

## 2020-02-17 RX ORDER — ALPRAZOLAM 0.5 MG/1
0.5 TABLET ORAL 2 TIMES DAILY
Status: DISCONTINUED | OUTPATIENT
Start: 2020-02-17 | End: 2020-02-18 | Stop reason: HOSPADM

## 2020-02-17 RX ORDER — ATORVASTATIN CALCIUM 20 MG/1
80 TABLET, FILM COATED ORAL DAILY
Status: DISCONTINUED | OUTPATIENT
Start: 2020-02-18 | End: 2020-02-18 | Stop reason: HOSPADM

## 2020-02-17 RX ORDER — PHENYLEPHRINE HCL IN 0.9% NACL 1 MG/10 ML
SYRINGE (ML) INTRAVENOUS AS NEEDED
Status: DISCONTINUED | OUTPATIENT
Start: 2020-02-17 | End: 2020-02-17 | Stop reason: HOSPADM

## 2020-02-17 RX ORDER — SODIUM CHLORIDE, SODIUM LACTATE, POTASSIUM CHLORIDE, CALCIUM CHLORIDE 600; 310; 30; 20 MG/100ML; MG/100ML; MG/100ML; MG/100ML
125 INJECTION, SOLUTION INTRAVENOUS CONTINUOUS
Status: DISCONTINUED | OUTPATIENT
Start: 2020-02-17 | End: 2020-02-18 | Stop reason: HOSPADM

## 2020-02-17 RX ORDER — OXYCODONE HYDROCHLORIDE 5 MG/1
5 TABLET ORAL EVERY 4 HOURS
Status: DISCONTINUED | OUTPATIENT
Start: 2020-02-17 | End: 2020-02-18 | Stop reason: HOSPADM

## 2020-02-17 RX ORDER — ROCURONIUM BROMIDE 10 MG/ML
INJECTION, SOLUTION INTRAVENOUS AS NEEDED
Status: DISCONTINUED | OUTPATIENT
Start: 2020-02-17 | End: 2020-02-17 | Stop reason: HOSPADM

## 2020-02-17 RX ORDER — PROPOFOL 10 MG/ML
INJECTION, EMULSION INTRAVENOUS
Status: DISCONTINUED | OUTPATIENT
Start: 2020-02-17 | End: 2020-02-17 | Stop reason: HOSPADM

## 2020-02-17 RX ORDER — DIPHENHYDRAMINE HCL 25 MG
25 CAPSULE ORAL
Status: DISCONTINUED | OUTPATIENT
Start: 2020-02-17 | End: 2020-02-18 | Stop reason: HOSPADM

## 2020-02-17 RX ORDER — ONDANSETRON 2 MG/ML
INJECTION INTRAMUSCULAR; INTRAVENOUS AS NEEDED
Status: DISCONTINUED | OUTPATIENT
Start: 2020-02-17 | End: 2020-02-17 | Stop reason: HOSPADM

## 2020-02-17 RX ORDER — ONDANSETRON 2 MG/ML
4 INJECTION INTRAMUSCULAR; INTRAVENOUS
Status: DISCONTINUED | OUTPATIENT
Start: 2020-02-17 | End: 2020-02-18 | Stop reason: HOSPADM

## 2020-02-17 RX ORDER — MIDAZOLAM HYDROCHLORIDE 1 MG/ML
INJECTION, SOLUTION INTRAMUSCULAR; INTRAVENOUS AS NEEDED
Status: DISCONTINUED | OUTPATIENT
Start: 2020-02-17 | End: 2020-02-17 | Stop reason: HOSPADM

## 2020-02-17 RX ORDER — PROPOFOL 10 MG/ML
INJECTION, EMULSION INTRAVENOUS AS NEEDED
Status: DISCONTINUED | OUTPATIENT
Start: 2020-02-17 | End: 2020-02-17 | Stop reason: HOSPADM

## 2020-02-17 RX ORDER — PROCHLORPERAZINE EDISYLATE 5 MG/ML
5 INJECTION INTRAMUSCULAR; INTRAVENOUS ONCE
Status: DISCONTINUED | OUTPATIENT
Start: 2020-02-17 | End: 2020-02-17 | Stop reason: HOSPADM

## 2020-02-17 RX ORDER — SODIUM CHLORIDE 0.9 % (FLUSH) 0.9 %
5-40 SYRINGE (ML) INJECTION AS NEEDED
Status: DISCONTINUED | OUTPATIENT
Start: 2020-02-17 | End: 2020-02-17 | Stop reason: HOSPADM

## 2020-02-17 RX ORDER — EPHEDRINE SULFATE/0.9% NACL/PF 50 MG/5 ML
SYRINGE (ML) INTRAVENOUS AS NEEDED
Status: DISCONTINUED | OUTPATIENT
Start: 2020-02-17 | End: 2020-02-17 | Stop reason: HOSPADM

## 2020-02-17 RX ORDER — ACETAMINOPHEN 325 MG/1
650 TABLET ORAL
Status: DISCONTINUED | OUTPATIENT
Start: 2020-02-17 | End: 2020-02-18 | Stop reason: HOSPADM

## 2020-02-17 RX ORDER — NALOXONE HYDROCHLORIDE 0.4 MG/ML
0.4 INJECTION, SOLUTION INTRAMUSCULAR; INTRAVENOUS; SUBCUTANEOUS AS NEEDED
Status: DISCONTINUED | OUTPATIENT
Start: 2020-02-17 | End: 2020-02-18 | Stop reason: HOSPADM

## 2020-02-17 RX ORDER — CYCLOBENZAPRINE HCL 10 MG
5 TABLET ORAL
Status: DISCONTINUED | OUTPATIENT
Start: 2020-02-17 | End: 2020-02-18 | Stop reason: HOSPADM

## 2020-02-17 RX ORDER — ACETAMINOPHEN 500 MG
1000 TABLET ORAL ONCE
Status: COMPLETED | OUTPATIENT
Start: 2020-02-17 | End: 2020-02-17

## 2020-02-17 RX ORDER — LIDOCAINE HYDROCHLORIDE 20 MG/ML
INJECTION, SOLUTION EPIDURAL; INFILTRATION; INTRACAUDAL; PERINEURAL AS NEEDED
Status: DISCONTINUED | OUTPATIENT
Start: 2020-02-17 | End: 2020-02-17 | Stop reason: HOSPADM

## 2020-02-17 RX ORDER — ALLOPURINOL 300 MG/1
300 TABLET ORAL DAILY
Status: DISCONTINUED | OUTPATIENT
Start: 2020-02-18 | End: 2020-02-18 | Stop reason: HOSPADM

## 2020-02-17 RX ORDER — MAGNESIUM SULFATE 100 %
4 CRYSTALS MISCELLANEOUS AS NEEDED
Status: DISCONTINUED | OUTPATIENT
Start: 2020-02-17 | End: 2020-02-17 | Stop reason: HOSPADM

## 2020-02-17 RX ORDER — VENLAFAXINE HYDROCHLORIDE 75 MG/1
150 CAPSULE, EXTENDED RELEASE ORAL DAILY
Status: DISCONTINUED | OUTPATIENT
Start: 2020-02-18 | End: 2020-02-18 | Stop reason: HOSPADM

## 2020-02-17 RX ORDER — SODIUM CHLORIDE 0.9 % (FLUSH) 0.9 %
5-40 SYRINGE (ML) INJECTION EVERY 8 HOURS
Status: DISCONTINUED | OUTPATIENT
Start: 2020-02-17 | End: 2020-02-18 | Stop reason: HOSPADM

## 2020-02-17 RX ORDER — PREGABALIN 75 MG/1
75 CAPSULE ORAL ONCE
Status: COMPLETED | OUTPATIENT
Start: 2020-02-17 | End: 2020-02-17

## 2020-02-17 RX ORDER — CEFAZOLIN SODIUM 2 G/50ML
2 SOLUTION INTRAVENOUS EVERY 8 HOURS
Status: COMPLETED | OUTPATIENT
Start: 2020-02-17 | End: 2020-02-18

## 2020-02-17 RX ORDER — HYDROMORPHONE HYDROCHLORIDE 2 MG/ML
0.5 INJECTION, SOLUTION INTRAMUSCULAR; INTRAVENOUS; SUBCUTANEOUS
Status: COMPLETED | OUTPATIENT
Start: 2020-02-17 | End: 2020-02-17

## 2020-02-17 RX ORDER — MORPHINE SULFATE 2 MG/ML
2 INJECTION, SOLUTION INTRAMUSCULAR; INTRAVENOUS
Status: DISCONTINUED | OUTPATIENT
Start: 2020-02-17 | End: 2020-02-18 | Stop reason: HOSPADM

## 2020-02-17 RX ORDER — GLYCOPYRROLATE 0.2 MG/ML
INJECTION INTRAMUSCULAR; INTRAVENOUS AS NEEDED
Status: DISCONTINUED | OUTPATIENT
Start: 2020-02-17 | End: 2020-02-17 | Stop reason: HOSPADM

## 2020-02-17 RX ORDER — FAMOTIDINE 20 MG/1
20 TABLET, FILM COATED ORAL DAILY
Status: DISCONTINUED | OUTPATIENT
Start: 2020-02-18 | End: 2020-02-18 | Stop reason: HOSPADM

## 2020-02-17 RX ORDER — FLUMAZENIL 0.1 MG/ML
0.2 INJECTION INTRAVENOUS
Status: DISCONTINUED | OUTPATIENT
Start: 2020-02-17 | End: 2020-02-17 | Stop reason: HOSPADM

## 2020-02-17 RX ORDER — DOCUSATE SODIUM 100 MG/1
100 CAPSULE, LIQUID FILLED ORAL 2 TIMES DAILY
Status: DISCONTINUED | OUTPATIENT
Start: 2020-02-17 | End: 2020-02-18 | Stop reason: HOSPADM

## 2020-02-17 RX ORDER — SODIUM CHLORIDE 0.9 % (FLUSH) 0.9 %
5-40 SYRINGE (ML) INJECTION EVERY 8 HOURS
Status: DISCONTINUED | OUTPATIENT
Start: 2020-02-17 | End: 2020-02-17 | Stop reason: HOSPADM

## 2020-02-17 RX ORDER — AMLODIPINE BESYLATE 2.5 MG/1
2.5 TABLET ORAL DAILY
Status: DISCONTINUED | OUTPATIENT
Start: 2020-02-18 | End: 2020-02-18 | Stop reason: HOSPADM

## 2020-02-17 RX ADMIN — Medication 100 MCG: at 08:20

## 2020-02-17 RX ADMIN — Medication 5 MG: at 08:05

## 2020-02-17 RX ADMIN — OXYCODONE 5 MG: 5 TABLET ORAL at 19:41

## 2020-02-17 RX ADMIN — Medication 100 MCG: at 08:22

## 2020-02-17 RX ADMIN — FENTANYL CITRATE 50 MCG: 50 INJECTION, SOLUTION INTRAMUSCULAR; INTRAVENOUS at 08:06

## 2020-02-17 RX ADMIN — FENTANYL CITRATE 50 MCG: 50 INJECTION, SOLUTION INTRAMUSCULAR; INTRAVENOUS at 08:14

## 2020-02-17 RX ADMIN — MORPHINE SULFATE 2 MG: 2 INJECTION, SOLUTION INTRAMUSCULAR; INTRAVENOUS at 21:19

## 2020-02-17 RX ADMIN — KETAMINE HYDROCHLORIDE 30 MG: 10 INJECTION, SOLUTION INTRAMUSCULAR; INTRAVENOUS at 08:15

## 2020-02-17 RX ADMIN — SODIUM CHLORIDE, SODIUM LACTATE, POTASSIUM CHLORIDE, AND CALCIUM CHLORIDE 125 ML/HR: 600; 310; 30; 20 INJECTION, SOLUTION INTRAVENOUS at 05:58

## 2020-02-17 RX ADMIN — Medication 100 MCG: at 08:25

## 2020-02-17 RX ADMIN — Medication 100 MG: at 08:06

## 2020-02-17 RX ADMIN — DEXAMETHASONE SODIUM PHOSPHATE 8 MG: 4 INJECTION, SOLUTION INTRAMUSCULAR; INTRAVENOUS at 09:20

## 2020-02-17 RX ADMIN — ACETAMINOPHEN 1000 MG: 500 TABLET ORAL at 06:07

## 2020-02-17 RX ADMIN — PHENYLEPHRINE HYDROCHLORIDE 50 MCG/MIN: 10 INJECTION INTRAVENOUS at 08:30

## 2020-02-17 RX ADMIN — ONDANSETRON HYDROCHLORIDE 4 MG: 2 INJECTION INTRAMUSCULAR; INTRAVENOUS at 10:05

## 2020-02-17 RX ADMIN — Medication 100 MCG: at 08:09

## 2020-02-17 RX ADMIN — LIDOCAINE HYDROCHLORIDE 80 MG: 20 INJECTION, SOLUTION EPIDURAL; INFILTRATION; INTRACAUDAL; PERINEURAL at 08:06

## 2020-02-17 RX ADMIN — PREGABALIN 75 MG: 75 CAPSULE ORAL at 06:07

## 2020-02-17 RX ADMIN — Medication 50 MCG: at 10:17

## 2020-02-17 RX ADMIN — SODIUM CHLORIDE, SODIUM LACTATE, POTASSIUM CHLORIDE, AND CALCIUM CHLORIDE: 600; 310; 30; 20 INJECTION, SOLUTION INTRAVENOUS at 10:20

## 2020-02-17 RX ADMIN — SODIUM CHLORIDE, SODIUM LACTATE, POTASSIUM CHLORIDE, AND CALCIUM CHLORIDE 125 ML/HR: 600; 310; 30; 20 INJECTION, SOLUTION INTRAVENOUS at 13:38

## 2020-02-17 RX ADMIN — TRANEXAMIC ACID 1 G: 100 INJECTION, SOLUTION INTRAVENOUS at 08:20

## 2020-02-17 RX ADMIN — HYDROMORPHONE HYDROCHLORIDE 0.5 MG: 2 INJECTION INTRAMUSCULAR; INTRAVENOUS; SUBCUTANEOUS at 11:45

## 2020-02-17 RX ADMIN — OXYCODONE 5 MG: 5 TABLET ORAL at 13:08

## 2020-02-17 RX ADMIN — HYDROMORPHONE HYDROCHLORIDE 0.5 MG: 2 INJECTION INTRAMUSCULAR; INTRAVENOUS; SUBCUTANEOUS at 12:05

## 2020-02-17 RX ADMIN — Medication 5 MG: at 08:22

## 2020-02-17 RX ADMIN — Medication 5 MG: at 08:21

## 2020-02-17 RX ADMIN — DOCUSATE SODIUM 100 MG: 100 CAPSULE, LIQUID FILLED ORAL at 13:08

## 2020-02-17 RX ADMIN — KETAMINE HYDROCHLORIDE 20 MG: 10 INJECTION, SOLUTION INTRAMUSCULAR; INTRAVENOUS at 08:30

## 2020-02-17 RX ADMIN — HYDROMORPHONE HYDROCHLORIDE 0.5 MG: 2 INJECTION INTRAMUSCULAR; INTRAVENOUS; SUBCUTANEOUS at 11:55

## 2020-02-17 RX ADMIN — Medication 10 MG: at 10:26

## 2020-02-17 RX ADMIN — Medication 10 ML: at 22:00

## 2020-02-17 RX ADMIN — PROPOFOL 75 MCG/KG/MIN: 10 INJECTION, EMULSION INTRAVENOUS at 08:30

## 2020-02-17 RX ADMIN — OXYCODONE 5 MG: 5 TABLET ORAL at 23:38

## 2020-02-17 RX ADMIN — DOCUSATE SODIUM 100 MG: 100 CAPSULE, LIQUID FILLED ORAL at 21:20

## 2020-02-17 RX ADMIN — ALPRAZOLAM 0.5 MG: 0.5 TABLET ORAL at 14:42

## 2020-02-17 RX ADMIN — Medication 5 MG: at 08:19

## 2020-02-17 RX ADMIN — Medication 150 MCG: at 10:20

## 2020-02-17 RX ADMIN — GLYCOPYRROLATE 0.1 MG: 0.2 INJECTION INTRAMUSCULAR; INTRAVENOUS at 08:35

## 2020-02-17 RX ADMIN — MIDAZOLAM 2 MG: 1 INJECTION INTRAMUSCULAR; INTRAVENOUS at 08:00

## 2020-02-17 RX ADMIN — CEFAZOLIN SODIUM 2 G: 2 SOLUTION INTRAVENOUS at 21:19

## 2020-02-17 RX ADMIN — CEFAZOLIN SODIUM 2 G: 2 SOLUTION INTRAVENOUS at 16:05

## 2020-02-17 RX ADMIN — HYDROMORPHONE HYDROCHLORIDE 0.5 MG: 2 INJECTION INTRAMUSCULAR; INTRAVENOUS; SUBCUTANEOUS at 11:35

## 2020-02-17 RX ADMIN — Medication 100 MCG: at 08:21

## 2020-02-17 RX ADMIN — CEFAZOLIN SODIUM 2 G: 2 SOLUTION INTRAVENOUS at 08:10

## 2020-02-17 RX ADMIN — ACETAMINOPHEN 650 MG: 325 TABLET ORAL at 21:20

## 2020-02-17 RX ADMIN — PROPOFOL 170 MG: 10 INJECTION, EMULSION INTRAVENOUS at 08:06

## 2020-02-17 RX ADMIN — OXYCODONE 5 MG: 5 TABLET ORAL at 16:05

## 2020-02-17 NOTE — ANESTHESIA POSTPROCEDURE EVALUATION
Post-Anesthesia Evaluation and Assessment    Cardiovascular Function/Vital Signs  Visit Vitals  /70   Pulse 88   Temp 36.5 °C (97.7 °F)   Resp 12   Ht 5' 7\" (1.702 m)   Wt 77.6 kg (171 lb)   SpO2 98%   BMI 26.78 kg/m²       Patient is status post Procedure(s):  REVISION THORACIC 10 - LUMBAR 2 POSTERIOR FUSION W/NEUROMONITORING AND C-ARM, \"SPEC POP\". Nausea/Vomiting: Controlled. Postoperative hydration reviewed and adequate. Pain:  Pain Scale 1: FLACC (02/17/20 1215)  Pain Intensity 1: 0 (02/17/20 1215)   Managed. Neurological Status:   Neuro (WDL): Within Defined Limits (02/17/20 1215)   At baseline. Mental Status and Level of Consciousness: Arousable. Pulmonary Status:   O2 Device: Nasal cannula (02/17/20 1145)   Adequate oxygenation and airway patent. Complications related to anesthesia: None    Post-anesthesia assessment completed. No concerns. Patient has met all discharge requirements. Signed By: Valentín Vaughn MD    February 17, 2020               Procedure(s):  REVISION THORACIC 10 - LUMBAR 2 POSTERIOR FUSION W/NEUROMONITORING AND C-ARM, \"SPEC POP\". general    <BSHSIANPOST>    Vitals Value Taken Time   /70 2/17/2020 12:20 PM   Temp 36.5 °C (97.7 °F) 2/17/2020 11:11 AM   Pulse 88 2/17/2020 12:24 PM   Resp 9 2/17/2020 12:24 PM   SpO2 93 % 2/17/2020 12:24 PM   Vitals shown include unvalidated device data.

## 2020-02-17 NOTE — INTERVAL H&P NOTE
H&P Update: 
Murtaza Chaparro was seen and examined. History and physical has been reviewed. The patient has been examined.  There have been no significant clinical changes since the completion of the originally dated History and Physical.

## 2020-02-17 NOTE — PROGRESS NOTES
Pt transported to room 215. Kae Vu RN is at bedside. Pt skin assessment performed and nothing new noted at this time. Pt dressings CDI at this time. Pt NAD at this time. No further questions from receiving nurse. Current vital signs noted below. Pt family is at bedside. Visit Vitals  BP (P) 110/72   Pulse (P) 88   Temp (P) 98.4 °F (36.9 °C)   Resp (P) 16   Ht 5' 7\" (1.702 m)   Wt 77.6 kg (171 lb)   SpO2 (P) 98%   BMI 26.78 kg/m²     Date 02/16/20 0700 - 02/17/20 0659(Not Admitted) 02/17/20 0700 - 02/18/20 0659   Shift 8302-0296 2353-7235 24 Hour Total 4964-6003 1449-6056 24 Hour Total   INTAKE   I.V.    1400  1400     Volume (lactated Ringers infusion)    1400  1400   Shift Total(mL/kg)    1400(18)  1400(18)   OUTPUT   Urine    300  300     Urine Output    200  200     Urine Output (mL) (Urinary Catheter 02/17/20 2- way; [place])    100  100   Blood    100  100     Estimated Blood Loss    100  100   Shift Total(mL/kg)    400(5.2)  400(5.2)   NET    1000  1000   Weight (kg)  77.6 77.6 77.6 77.6 77.6

## 2020-02-17 NOTE — ROUTINE PROCESS
[phone] TRANSFER - IN REPORT: 
 
Verbal report received from [facility]) on Bree Coates being received from PACU(unit) for routine post - op Report consisted of patients Situation, Background, Assessment and  
Recommendations(SBAR). Information from the following report(s) SBAR, Kardex, Intake/Output, MAR and Recent Results was reviewed with the receiving nurse. Opportunity for questions and clarification was provided. Assessment completed upon patients arrival to unit and care assumed.

## 2020-02-17 NOTE — ANESTHESIA PREPROCEDURE EVALUATION
Relevant Problems   No relevant active problems       Anesthetic History   No history of anesthetic complications            Review of Systems / Medical History  Patient summary reviewed, nursing notes reviewed and pertinent labs reviewed    Pulmonary  Within defined limits                 Neuro/Psych         TIA     Cardiovascular    Hypertension: well controlled              Exercise tolerance: >4 METS     GI/Hepatic/Renal     GERD           Endo/Other        Arthritis     Other Findings              Physical Exam    Airway  Mallampati: III  TM Distance: 4 - 6 cm  Neck ROM: normal range of motion   Mouth opening: Normal     Cardiovascular               Dental         Pulmonary                 Abdominal  GI exam deferred       Other Findings            Anesthetic Plan    ASA: 2  Anesthesia type: general          Induction: Intravenous  Anesthetic plan and risks discussed with: Patient

## 2020-02-17 NOTE — PERIOP NOTES
Pt. Used restroom in pre-op area with assistance. Patient placed on Jayne Paws for a minimum of 30 min in  Preop.

## 2020-02-17 NOTE — PROGRESS NOTES
Problem: Diabetes Self-Management  Goal: *Disease process and treatment process  Description  Define diabetes and identify own type of diabetes; list 3 options for treating diabetes. Outcome: Progressing Towards Goal  Goal: *Incorporating nutritional management into lifestyle  Description  Describe effect of type, amount and timing of food on blood glucose; list 3 methods for planning meals. Outcome: Progressing Towards Goal  Goal: *Incorporating physical activity into lifestyle  Description  State effect of exercise on blood glucose levels. Outcome: Progressing Towards Goal  Goal: *Developing strategies to promote health/change behavior  Description  Define the ABC's of diabetes; identify appropriate screenings, schedule and personal plan for screenings. Outcome: Progressing Towards Goal  Goal: *Using medications safely  Description  State effect of diabetes medications on diabetes; name diabetes medication taking, action and side effects. Outcome: Progressing Towards Goal  Goal: *Monitoring blood glucose, interpreting and using results  Description  Identify recommended blood glucose targets  and personal targets. Outcome: Progressing Towards Goal  Goal: *Prevention, detection, treatment of acute complications  Description  List symptoms of hyper- and hypoglycemia; describe how to treat low blood sugar and actions for lowering  high blood glucose level. Outcome: Progressing Towards Goal  Goal: *Prevention, detection and treatment of chronic complications  Description  Define the natural course of diabetes and describe the relationship of blood glucose levels to long term complications of diabetes.   Outcome: Progressing Towards Goal  Goal: *Developing strategies to address psychosocial issues  Description  Describe feelings about living with diabetes; identify support needed and support network  Outcome: Progressing Towards Goal  Goal: *Insulin pump training  Outcome: Progressing Towards Goal  Goal: *Sick day guidelines  Outcome: Progressing Towards Goal  Goal: *Patient Specific Goal (EDIT GOAL, INSERT TEXT)  Outcome: Progressing Towards Goal     Problem: Patient Education: Go to Patient Education Activity  Goal: Patient/Family Education  Outcome: Progressing Towards Goal     Problem: Falls - Risk of  Goal: *Absence of Falls  Description  Document Deana Garcia Fall Risk and appropriate interventions in the flowsheet.   Outcome: Progressing Towards Goal  Note: Fall Risk Interventions:  Mobility Interventions: Patient to call before getting OOB, PT Consult for mobility concerns, Utilize walker, cane, or other assistive device         Medication Interventions: Patient to call before getting OOB    Elimination Interventions: Call light in reach              Problem: Patient Education: Go to Patient Education Activity  Goal: Patient/Family Education  Outcome: Progressing Towards Goal     Problem: Pain  Goal: *Control of Pain  Outcome: Progressing Towards Goal  Goal: *PALLIATIVE CARE:  Alleviation of Pain  Outcome: Progressing Towards Goal     Problem: Patient Education: Go to Patient Education Activity  Goal: Patient/Family Education  Outcome: Progressing Towards Goal     Problem: Patient Education: Go to Patient Education Activity  Goal: Patient/Family Education  Outcome: Progressing Towards Goal     Problem: Surgical Pathway Day of Surgery  Goal: Off Pathway (Use only if patient is Off Pathway)  Outcome: Progressing Towards Goal  Goal: Activity/Safety  Outcome: Progressing Towards Goal  Goal: Consults, if ordered  Outcome: Progressing Towards Goal  Goal: Nutrition/Diet  Outcome: Progressing Towards Goal  Goal: Medications  Outcome: Progressing Towards Goal  Goal: Respiratory  Outcome: Progressing Towards Goal  Goal: Treatments/Interventions/Procedures  Outcome: Progressing Towards Goal  Goal: Psychosocial  Outcome: Progressing Towards Goal  Goal: *No signs and symptoms of infection or wound complications  Outcome: Progressing Towards Goal  Goal: *Optimal pain control at patient's stated goal  Outcome: Progressing Towards Goal  Goal: *Adequate urinary output (equal to or greater than 30 milliliters/hour)  Description  Ambulatory Surgery patients voiding without difficulty.   Outcome: Progressing Towards Goal  Goal: *Hemodynamically stable  Outcome: Progressing Towards Goal  Goal: *Tolerating diet  Outcome: Progressing Towards Goal  Goal: *Demonstrates progressive activity  Outcome: Progressing Towards Goal     Problem: Hypertension  Goal: *Blood pressure within specified parameters  Outcome: Progressing Towards Goal  Goal: *Fluid volume balance  Outcome: Progressing Towards Goal  Goal: *Labs within defined limits  Outcome: Progressing Towards Goal     Problem: Patient Education: Go to Patient Education Activity  Goal: Patient/Family Education  Outcome: Progressing Towards Goal

## 2020-02-17 NOTE — PERIOP NOTES
TRANSFER - OUT REPORT:    Verbal report given to [address] [place] [address] on Gerardine Grief  being transferred to [address] [place] [address] for routine post - op       Report consisted of patients Situation, Background, Assessment and   Recommendations(SBAR). Information from the following report(s) SBAR and MAR was reviewed with the receiving nurse. Opportunity for questions and clarification was provided.       Patient transported with:   O2 @ 2 liters  Registered Nurse  [facility]

## 2020-02-17 NOTE — PROGRESS NOTES
Problem: Mobility Impaired (Adult and Pediatric)  Goal: *Acute Goals and Plan of Care (Insert Text)  Description  Goals to be addressed in 1-4 days:  STG  1. Rolling L to R to L modified independent for positioning. 2. Supine to sit to supine S with HR for meals. 3. Sit to stand to sit S with RW in prep for ambulation. LTG  1. Ambulate >150ft S with RW, WBAT, for home/community mobility. 2. Ascend/descend a >4 stair steps CGA with HR for home entry. 3. Tolerate up in chair 1-2 hours for ADLs. 4. Patient/family to be independent with HEP for follow-up care and safe discharge. Note:   PHYSICAL THERAPY EVALUATION    Patient: Carina Garcia (28 y.o. male)  Date: 2/17/2020  Primary Diagnosis: Spinal [facility] T12 [facility] [S22.089A]  Procedure(s) (LRB):  REVISION THORACIC 10 - LUMBAR 2 POSTERIOR FUSION W/NEUROMONITORING AND C-ARM, \"SPEC POP\" (N/A) Day of Surgery   Precautions:   Fall, Back, Spinal    ASSESSMENT :  Based on the objective data described below, the patient presents with lower extremity weakness, decreased gait quality and endurance, impaired bed mobility and transfers, and overall limitations in functional mobility s/p revision T 10-L2 posterior fusion. Pt performed supine to sidelying to sit with CGA, sit to stand with CGA. Patient ambulated 100 feet with RW, GB applied, back brace applied, CGA. Pt tolerated session well as evidenced by no c/o increased pain, no lightheadedness or dizziness. Patient would benefit from skilled inpatient physical therapy to address deficits, progress as tolerated to achieve long term goals and allow safe discharge. Patient will benefit from skilled intervention to address the above impairments.   Patients rehabilitation potential is considered to be Good  Factors which may influence rehabilitation potential include:   []         None noted  []         Mental ability/status  [x]         Medical condition  []         Home/family situation and support systems  []         Safety awareness  []         Pain tolerance/management  []         Other:      PLAN :  Recommendations and Planned Interventions:  [x]           Bed Mobility Training             [x]    Neuromuscular Re-Education  [x]           Transfer Training                   []    Orthotic/Prosthetic Training  [x]           Gait Training                          []    Modalities  [x]           Therapeutic Exercises          []    Edema Management/Control  [x]           Therapeutic Activities            [x]    Patient and Family Training/Education  []           Other (comment):    Frequency/Duration: Patient will be followed by physical therapy twice daily to address goals. Discharge Recommendations: Home Health  Further Equipment Recommendations for Discharge: N/A     SUBJECTIVE:   Patient stated I am doing ok.     OBJECTIVE DATA SUMMARY:     Past Medical History:   Diagnosis Date    Arthritis     Back    Valencia's esophagus     Chronic pain     intermittent lumbar x 5 years    Diverticulitis     GERD (gastroesophageal reflux disease)     Hypertension     Ill-defined condition     gout    Ill-defined condition     high cholesterol    Ill-defined condition     anxiety    Psychiatric disorder     anxiety    Stroke ([facility] 10/2014     Past Surgical History:   Procedure Laterality Date    HX GI      colon resection, diverticulitis    HX HERNIA REPAIR      Incisional    HX HERNIA REPAIR  1968    Ingunial    HX OTHER SURGICAL      Barretts esopagus- caudirized     NEUROLOGICAL PROCEDURE UNLISTED  2019    Posterior fusion     Barriers to Learning/Limitations: None  Compensate with: Visual Cues, Verbal Cues, and Tactile Cues  Prior Level of Function/Home Situation: Independent amb s/AD  Home Situation  Home Environment: Private residence  # Steps to Enter: 4  Rails to Enter: Yes  Hand Rails : Bilateral  One/Two Story Residence: One story  Living Alone: No  Support Systems: Spouse/Significant Other/Partner  Patient Expects to be Discharged to[de-identified] Private residence  Current DME Used/Available at Home: Lonzell Leavens, rolling, Brace/Splint  Critical Behavior:  Neurologic State: Alert  Orientation Level: Oriented X4  Cognition: Appropriate for age attention/concentration   Psychosocial  Purposeful Interaction: Yes  Pt Identified Daily Priority: Clinical issues (comment)  Caritas Process: Nurture loving kindness  Caring Interventions: Reassure  Reassure: Therapeutic listening  Skin Condition/Temp: Dry;Warm  Skin Integrity: Incision (comment)  Skin Integumentary  Skin Color: Appropriate for ethnicity  Skin Condition/Temp: Dry;Warm  Skin Integrity: Incision (comment)  Turgor: Non-tenting  Hair Growth: Present  Varicosities: Absent   Strength:    Strength: Generally decreased, functional  Tone & Sensation:   Tone: Normal  Sensation: Impaired  Range Of Motion:  AROM: Generally decreased, functional  PROM: Generally decreased, functional  Functional Mobility:  Bed Mobility:  Supine to Sit: Contact guard assistance  Sit to Supine: Contact guard assistance  Transfers:  Sit to Stand: Contact guard assistance  Stand to Sit: Contact guard assistance  Balance:   Sitting: Intact  Standing: Intact; With support  Ambulation/Gait Training:  Distance (ft): 100 Feet (ft)  Assistive Device: Gait belt;Walker, rolling;Brace/Splint  Ambulation - Level of Assistance: Contact guard assistance  Gait Description (WDL): Exceptions to WDL  Gait Abnormalities: Decreased step clearance  Base of Support: Narrowed  Speed/Yuliya: Slow  Pain:  Pain Scale 1: Numeric (0 - 10)  Pain Intensity 1: 2  Pain Location 1: Back  Pain Orientation 1: Posterior  Pain Description 1: Dull  Pain Intervention(s) 1: Repositioned;Rest;Declines  Activity Tolerance:   Good  Please refer to the flowsheet for vital signs taken during this treatment.   After treatment:   []         Patient left in no apparent distress sitting up in chair  [x]         Patient left in no apparent distress in bed  [x] Call bell left within reach  [x]         Nursing notified  []         Caregiver present  []         Bed alarm activated    COMMUNICATION/EDUCATION:   [x]         Fall prevention education was provided and the patient/caregiver indicated understanding. [x]         Patient/family have participated as able in goal setting and plan of care. [x]         Patient/family agree to work toward stated goals and plan of care. []         Patient understands intent and goals of therapy, but is neutral about his/her participation. []         Patient is unable to participate in goal setting and plan of care.     Thank you for this referral.  Nikita Cagle   Time Calculation: 23 mins   Eval Complexity: History: MEDIUM  Complexity : 1-2 comorbidities / personal factors will impact the outcome/ POC Exam:MEDIUM Complexity : 3 Standardized tests and measures addressing body structure, function, activity limitation and / or participation in recreation  Presentation: LOW Complexity : Stable, uncomplicated  Clinical Decision Making:Low Complexity    Overall Complexity:LOW

## 2020-02-17 NOTE — PROGRESS NOTES
Rakesh [place] care of patient at this time, assessment complete. Patient alert and oriented x4. Denies SOB and chest pain. Patient lungs clear bilaterally. Cap refilled  less than 3 seconds. Patient denies numbness and tingling to all extremities. Stated pain 2/10. Patient has 18G IV to left forearm. Dressing to back, C/D/I . TEDs and SCDs applied to BLE. Patient encouraged to use IS. Patient verbalized understanding. Ice pack in place, call light and personal items in reach, bed in low position and locked, will continue to monitor patient. Fall risk arm band in place. Spouse at bedside. 1400  Physical therapist is working with patient at this time, patient ambulated hallway. 1845  Patient had uneventful shift, pain controlled by schedules pain medication Roxicodone. No signs or symptoms of distress. Patient ambulating and voiding sufficient amounts. Plan for patient to d/c home. 1930  Bedside in verbal shift change report given to Patricia WRIGHT (oncoming nurse) by Kadie Galvan RN (off going nurse). Report included the following information: SBAR, KARDEX, MAR and recent results.

## 2020-02-17 NOTE — OP NOTES
Operative Report    Patient: Sushil Ho MRN: [id number]  SSN: [id number]    YOB: 1965  Age: 60 y.o. Sex: male       Date of Surgery: 2/17/2020     Preoperative Diagnosis: INTERVERTEBRAL DISC DISORDERS WITH RADICULOPATHY     Postoperative Diagnosis: INTERVERTEBRAL DISC DISORDERS WITH RADICULOPATHY     Surgeon(s) and Role:     Joe Harris MD - Primary    Anesthesia: General     Procedure: Procedure(s):  REVISION THORACIC 10 - LUMBAR 2 POSTERIOR FUSION W/NEUROMONITORING AND C-ARM, \"SPEC POP\"     Procedure in Detail: After sterile preparation of the surgical field a timeout was conducted to ensure correct surgery, site and patient. Following a 10 blade was used to make skin incision over the surgical side which was identified by skin anatomy. Bovie was then used to bring incision down to fascia. Once fascia was manually cleared, Bovie was then used again to bring exposure down the lamina of the vertebrae and out to the Transverse process. Identification of the spinal elements were conducted simultaneously. Hardware was identified at L1. A solid fusion mass was also noted laterally and well seated hardware without loosening. Following, using Kocher's the correct levels were confirmed using fluoroscopic guidance. Once confirmed pedicle screws were then placed from T10 to T11. Fluoro and Neuromonitoring stimulation confirmed correct placement of screws. Fluoro at this point confirmed reduction of the T12 fracture. Once correct alignment of the spine was confirmed with fluoro rods with connectors were placed extending the fusion from T10 to pelvis. The paige was used to decorticate the posterior elements B/L from T10-L1. Wound was then irrigated with 1L of Ns. vivogen was placed in the posterior elements from T10-L1.  1g of vancomycin was then placed in wound. Closure consisted of #1 vicryl  interrupted suture followed by another #1 vicryl running suture for the fascia.  Following the subQ layer was closed with 2.0 vicryl interrupted. Finally skin was closed with 4.0 Monocryl running. After dressing consisted of prineo and mepalex dressing. The patient did well the entire case and I was present for all portions of the case. Estimated Blood Loss:  100cc    Tourniquet Time: * No tourniquets in log *      Implants:   Implant Name Type Inv. Item Serial No.  Lot No. LRB No. Used Action   PRIME [facility]   [id number]   N/A 1 Implanted   VIVIGEN   [id number] LIFENET  N/A 1 Implanted   SCR SET SPNE SGL 5.5 XPED TI --  - [id number]  SCR SET SPNE SGL 5.5 XPED TI --   [employer]PU[employer] SPINE [id number] N/A 6 Implanted   LOREN SPNE HEX PC PB [id number] -- EXPEDIUM TI - [id number]  LOREN SPNE HEX PC PB [id number] -- EXPEDIUM TI  [employer]PU[employer] SPINE [id number] N/A 1 Implanted   SCR SPNE DAVE FIX 5.5 7X50MM --  - [id number]  SCR SPNE DAVE FIX 5.5 7X50MM --   [employer]PU[employer] SPINE [id number] N/A 4 Implanted   CONNECTOR    SYNTHES SPINAL [id number] N/A 3 Implanted   LOREN SPNE HEX PC PB [id number] -- EXPEDIUM TI - [id number]  LOREN SPNE HEX PC PB [id number] -- EXPEDIUM TI  JNJ DEPUY SPINE [id number] N/A 1 Implanted               Specimens: * No specimens in log *        Drains: None                Complications: None    Counts: Sponge and needle counts were correct times two.     Signed By:  Gustabo Cameron MD     February 17, 2020

## 2020-02-18 ENCOUNTER — APPOINTMENT (OUTPATIENT)
Dept: GENERAL RADIOLOGY | Age: 55
DRG: 460 | End: 2020-02-18
Attending: ORTHOPAEDIC SURGERY
Payer: COMMERCIAL

## 2020-02-18 VITALS
WEIGHT: 171 LBS | BODY MASS INDEX: 26.84 KG/M2 | RESPIRATION RATE: 16 BRPM | TEMPERATURE: 98.3 F | DIASTOLIC BLOOD PRESSURE: 69 MMHG | HEART RATE: 97 BPM | SYSTOLIC BLOOD PRESSURE: 122 MMHG | OXYGEN SATURATION: 98 % | HEIGHT: 67 IN

## 2020-02-18 PROCEDURE — 74011250637 HC RX REV CODE- 250/637: Performed by: ORTHOPAEDIC SURGERY

## 2020-02-18 PROCEDURE — 74011250636 HC RX REV CODE- 250/636: Performed by: ORTHOPAEDIC SURGERY

## 2020-02-18 PROCEDURE — 77030027138 HC INCENT SPIROMETER -A

## 2020-02-18 PROCEDURE — 97116 GAIT TRAINING THERAPY: CPT

## 2020-02-18 PROCEDURE — 97530 THERAPEUTIC ACTIVITIES: CPT

## 2020-02-18 PROCEDURE — 97165 OT EVAL LOW COMPLEX 30 MIN: CPT

## 2020-02-18 PROCEDURE — 72100 X-RAY EXAM L-S SPINE 2/3 VWS: CPT

## 2020-02-18 RX ORDER — ONDANSETRON 8 MG/1
4 TABLET, ORALLY DISINTEGRATING ORAL
Qty: 30 TAB | Refills: 0 | Status: SHIPPED | OUTPATIENT
Start: 2020-02-18

## 2020-02-18 RX ORDER — GABAPENTIN 300 MG/1
300 CAPSULE ORAL 3 TIMES DAILY
Qty: 90 CAP | Refills: 0 | Status: SHIPPED | OUTPATIENT
Start: 2020-02-18

## 2020-02-18 RX ORDER — ACETAMINOPHEN 325 MG/1
650 TABLET ORAL EVERY 6 HOURS
Qty: 168 TAB | Refills: 0 | Status: SHIPPED | OUTPATIENT
Start: 2020-02-18 | End: 2020-03-10

## 2020-02-18 RX ORDER — OXYCODONE HYDROCHLORIDE 5 MG/1
5 TABLET ORAL
Qty: 28 TAB | Refills: 0 | Status: SHIPPED | OUTPATIENT
Start: 2020-02-18 | End: 2020-02-25

## 2020-02-18 RX ORDER — CYCLOBENZAPRINE HCL 10 MG
5 TABLET ORAL
Qty: 90 TAB | Refills: 0 | Status: SHIPPED | OUTPATIENT
Start: 2020-02-18

## 2020-02-18 RX ORDER — NALOXONE HYDROCHLORIDE 4 MG/.1ML
SPRAY NASAL
Qty: 2 EACH | Refills: 0 | Status: SHIPPED | OUTPATIENT
Start: 2020-02-18

## 2020-02-18 RX ADMIN — ATORVASTATIN CALCIUM 80 MG: 20 TABLET, FILM COATED ORAL at 08:20

## 2020-02-18 RX ADMIN — OXYCODONE 5 MG: 5 TABLET ORAL at 04:14

## 2020-02-18 RX ADMIN — FAMOTIDINE 20 MG: 20 TABLET ORAL at 08:21

## 2020-02-18 RX ADMIN — CEFAZOLIN SODIUM 2 G: 2 SOLUTION INTRAVENOUS at 06:27

## 2020-02-18 RX ADMIN — OXYCODONE 5 MG: 5 TABLET ORAL at 12:33

## 2020-02-18 RX ADMIN — DOCUSATE SODIUM 100 MG: 100 CAPSULE, LIQUID FILLED ORAL at 08:19

## 2020-02-18 RX ADMIN — VENLAFAXINE HYDROCHLORIDE 150 MG: 75 CAPSULE, EXTENDED RELEASE ORAL at 08:20

## 2020-02-18 RX ADMIN — LOSARTAN POTASSIUM 25 MG: 25 TABLET ORAL at 08:21

## 2020-02-18 RX ADMIN — OXYCODONE 5 MG: 5 TABLET ORAL at 08:21

## 2020-02-18 RX ADMIN — ALPRAZOLAM 0.5 MG: 0.5 TABLET ORAL at 08:19

## 2020-02-18 RX ADMIN — ALLOPURINOL 300 MG: 300 TABLET ORAL at 08:21

## 2020-02-18 RX ADMIN — AMLODIPINE BESYLATE 2.5 MG: 2.5 TABLET ORAL at 08:21

## 2020-02-18 RX ADMIN — METOPROLOL SUCCINATE 50 MG: 50 TABLET, EXTENDED RELEASE ORAL at 08:19

## 2020-02-18 NOTE — PROGRESS NOTES
Problem: Falls - Risk of  Goal: *Absence of Falls  Description  Document Bishop Tong Fall Risk and appropriate interventions in the flowsheet.   Outcome: Progressing Towards Goal  Note: Fall Risk Interventions:  Mobility Interventions: Patient to call before getting OOB, PT Consult for mobility concerns, Utilize walker, cane, or other assistive device         Medication Interventions: Patient to call before getting OOB    Elimination Interventions: Call light in reach

## 2020-02-18 NOTE — PROGRESS NOTES
0738 - Bedside shift report received from ADRIANA Gomez. Assumed care of patient. Patient noted off floor for xray at this time. Call light in reach. 3616 - Assessment complete. Patient alert and oriented x 4. Cap refills < 3 sec. Pulses palpable and equal bilaterally. Lung sounds clear bilaterally. Respirations even and unlabored. Abd soft and nontender. Bowel sounds active to all 4 quads. Merlos removed at this time. Skin warm and dry. Drsg to back noted CDI. IV to left FA, site CDI. Edmund hose to BLE. SCD's to BLE. Reports pain 6/10. PRN oxycodone administered. Patient resting in bed with call light in reach.

## 2020-02-18 NOTE — DISCHARGE SUMMARY
Discharge Summary     Patient: Rosalie Hubbard MRN: [id number]  SSN: [id number]    YOB: 1965  Age: 60 y.o. Sex: male       Admit Date: 2/17/2020    Discharge Date: 2/18/2020      Admission Diagnoses: Spinal fracture of T12 vertebra ([facility] [E07.991Z]  [facility] [Q15.571L]    Discharge Diagnoses:   Problem List as of 2/18/2020 Date Reviewed: 11/11/2019          Codes Class Noted - Resolved    Spinal [facility]12 [facility] ([facility] ICD-10-CM: F71.631S  ICD-9-CM: 805.2  2/17/2020 - Present        Postoperative anemia due to acute blood loss ICD-10-CM: D62  ICD-9-CM: 285.1  11/12/2019 - Present        GERD (gastroesophageal reflux disease) ICD-10-CM: K21.9  ICD-9-CM: 530.81  11/12/2019 - Present        Acute respiratory failure ([facility] ICD-10-CM: J96.00  ICD-9-CM: 518.81  11/12/2019 - Present        Hypomagnesemia ICD-10-CM: E83.42  ICD-9-CM: 275.2  11/12/2019 - Present        DDD (degenerative disc disease), lumbar ICD-10-CM: M51.36  ICD-9-CM: 722.52  11/11/2019 - Present               Discharge Condition: Good    Hospital Course: SPINE DISCHARGE SUMMARY      Procedure: revision T10-L2 fusion with reduction of T12 fracture. HPI:  This is a 60y.o. year old male that presented to the office with signs and symptoms of T12 fracture and painful hardware . They tried and failed conservative treatment measures and wished to proceed with surgical intervention. The risks, benefits, and complications of the procedure were discussed with the patient and informed consent was obtained. Hospital Course: The patient was admitted to the hospital on 2/17/2020 and underwent an uncomplicated revision C68-R4 fusion with reduction of T12 fracture. . They were transferred to the floor after a brief stay in the post-anesthesia care unit. Their pain was well managed with IV and oral pain medications. They began therapy on post operative day #1.  Daily discussion was had with the patient, nursing staff, orthopaedic team, and family members if present. All questions were answered to the patients satisfaction. Patient will be discharge to home today in good condition. Patient is scheduled for followup with me in 2 weeks. Consults: None    Significant Diagnostic Studies:     Disposition: home    Discharge Medications:   Current Discharge Medication List      CONTINUE these medications which have NOT CHANGED    Details   losartan (COZAAR) 25 mg tablet Take 25 mg by mouth daily. raNITIdine (ZANTAC) 300 mg tab Take 300 mg by mouth daily. amLODIPine (NORVASC) 2.5 mg tablet Take 2.5 mg by mouth daily. cyclobenzaprine (FLEXERIL) 10 mg tablet Take 0.5 Tabs by mouth three (3) times daily as needed for Muscle Spasm(s). Qty: 90 Tab, Refills: 0      metoprolol succinate (TOPROL-XL) 50 mg XL tablet Take 50 mg by mouth daily. ferrous sulfate (IRON) 325 mg (65 mg iron) tablet Take 65 mg by mouth Daily (before breakfast). multivitamin (ONE A DAY) tablet Take 1 Tab by mouth daily. ascorbic acid, vitamin C, (VITAMIN C) 500 mg tablet Take 1,000 mg by mouth daily. aspirin (ASPIRIN) 325 mg tablet Take 325 mg by mouth daily. venlafaxine-SR (EFFEXOR XR) 150 mg capsule Take 150 mg by mouth daily. allopurinol (ZYLOPRIM) 300 mg tablet Take 300 mg by mouth daily. atorvastatin (LIPITOR) 20 mg tablet Take 80 mg by mouth daily. ALPRAZolam (XANAX) 0.5 mg tablet Take 0.5 mg by mouth two (2) times daily as needed. Indications: anxious      omeprazole (PRILOSEC) 40 mg capsule Take 1 Cap by mouth daily. Take half an hour before first meal of the day  Indications: EROSIVE ESOPHAGITIS, Valencia's esophagitis  Qty: 90 Cap, Refills: 3             Activity: Activity as tolerated  Diet: Regular Diet  Wound Care: Keep wound clean and dry    No orders of the defined types were placed in this encounter.       Signed By: Mitchell De La Torre MD     February 18, 2020

## 2020-02-18 NOTE — PROGRESS NOTES
Subjective:  60 y.o. male post operative day 1 Status Post revision T10-L2 fusion with reduction of T12 fracture. Pt. Doing well. Pain well controlled. No O/N events. Pt. Notes the back pain from preop is greatly improved. Labs:  No results found for this or any previous visit (from the past 24 hour(s)).   Meds:    Current Facility-Administered Medications:     lactated Ringers infusion, 125 mL/hr, IntraVENous, CONTINUOUS, Camille Rodríguez MD, Last Rate: 125 mL/hr at 02/17/20 1338, 125 mL/hr at 02/17/20 1338    allopurinoL (ZYLOPRIM) tablet 300 mg, 300 mg, Oral, DAILY, Junito Delgadillo MD    ALPRAZolam Gerianne Drain) tablet 0.5 mg, 0.5 mg, Oral, BID, Camille Rodríguez MD, 0.5 mg at 02/17/20 1442    amLODIPine (NORVASC) tablet 2.5 mg, 2.5 mg, Oral, DAILY, Camille Rodríguez MD    atorvastatin (LIPITOR) tablet 80 mg, 80 mg, Oral, DAILY, Junito Delgadillo MD    losartan (COZAAR) tablet 25 mg, 25 mg, Oral, DAILY, Camille Rodríguez MD    metoprolol succinate (TOPROL-XL) XL tablet 50 mg, 50 mg, Oral, DAILY, Camille Rodríguez MD    venlafaxine-SR [facility] capsule 150 mg, 150 mg, Oral, DAILY, Camille Rodríguez MD    sodium chloride (NS) flush 5-40 mL, 5-40 mL, IntraVENous, Q8H, Junito Delgadillo MD, 10 mL at 02/17/20 2200    sodium chloride (NS) flush 5-40 mL, 5-40 mL, IntraVENous, PRN, Camille Rodríguez MD    acetaminophen (TYLENOL) tablet 650 mg, 650 mg, Oral, Q4H PRN, Camille Rodríguez MD, 650 mg at 02/17/20 2120    oxyCODONE IR (ROXICODONE) tablet 5 mg, 5 mg, Oral, Q4H, Camille Rodríguez MD, 5 mg at 02/18/20 0414    morphine injection 2 mg, 2 mg, IntraVENous, Q4H PRN, Camille Rodríguez MD, 2 mg at 02/17/20 2119    naloxone (NARCAN) injection 0.4 mg, 0.4 mg, IntraVENous, PRN, Camille Rodríguez MD    ondansetron (ZOFRAN) injection 4 mg, 4 mg, IntraVENous, Q4H PRN, Camille Rodríguez MD    phenol throat spray (CHLORASEPTIC) 1 Spray, 1 Spray, Oral, PRN, Camille Rodríguez MD    diphenhydrAMINE (BENADRYL) capsule 25 mg, 25 mg, Oral, Q4H PRN, Jaky Chau MD    cyclobenzaprine (FLEXERIL) tablet 5 mg, 5 mg, Oral, TID PRN, Jaky Chau MD    docusate sodium (COLACE) capsule 100 mg, 100 mg, Oral, BID, Jaky Chau MD, 100 mg at 02/17/20 2120    famotidine (PEPCID) tablet 20 mg, 20 mg, Oral, DAILY, Jaky Chau MD  Blood Culture:  No components found for: BLOODCX  Wound Culture:  No results found for: WOUNDCULT  Ins and Outs:  No intake/output data recorded. Physical Exam:  Visit Vitals  /67 (BP 1 Location: Right arm, BP Patient Position: At rest;Supine)   Pulse 96   Temp 97.6 °F (36.4 °C)   Resp 15   Ht 5' 7\" (1.702 m)   Wt 77.6 kg (171 lb)   SpO2 93%   BMI 26.78 kg/m²     ? General: Awake, Alert, Oriented  ? Eyes: Pupils equal, round and reactive to light  ? Heart: regular rate and rhythm  ? Lungs: No audible wheezing  ?  Abdomen: soft  Lumbar   Dressing C/D/I   SILT L2-S1 Bilaterally:    5/5 motor Bilaterally:           Assessment:    POD 1 From above surgery Doing well    Plan:  Weight bearing as tolerated all extremities  Up and out of bed  DVT prophylaxis- mechanical  Antibiotics: complete course  Pain control: analgesics  PT/OT  Discharge planning in progress    Cielo Yung MD

## 2020-02-18 NOTE — PROGRESS NOTES
Problem: Mobility Impaired (Adult and Pediatric)  Goal: *Acute Goals and Plan of Care (Insert Text)  Description  Goals to be addressed in 1-4 days:  STG  1. Rolling L to R to L modified independent for positioning. 2. Supine to sit to supine S with HR for meals. 3. Sit to stand to sit S with RW in prep for ambulation. LTG  1. Ambulate >150ft S with RW, WBAT, for home/community mobility. 2. Ascend/descend a >4 stair steps CGA with HR for home entry. 3. Tolerate up in chair 1-2 hours for ADLs. 4. Patient/family to be independent with HEP for follow-up care and safe discharge. Outcome: Resolved/Met  Note:   PHYSICAL THERAPY TREATMENT/DISCHARGE    Patient: Al Schwab (29 y.o. male)  Date: 2/18/2020  Diagnosis: Spinal fracture of T12 vertebra ([facility] [address] [S22.089A]  Spinal fracture of T12 vertebra ([facility] [address] [R73.962P]   <principal problem not specified>  Procedure(s) (LRB):  REVISION THORACIC 10 - LUMBAR 2 POSTERIOR FUSION W/NEUROMONITORING AND C-ARM, \"SPEC POP\" (N/A) 1 Day Post-Op  Precautions: Fall, Back, Spinal  Chart, physical therapy assessment, plan of care and goals were reviewed. ASSESSMENT:  Pt rating pain w/ mobility 6/10 on numerical pain scale in back. Pt able to participate in transfer training, gt training and stair training meeting goals for this level of PT. Pt left supine in bed w/ all needs within reach, Nurse Howard Leader aware and SCDs reapplied. Progression toward goals:  [x]      Goals met  []      Improving appropriately and progressing toward goals  []      Improving slowly and progressing toward goals  []      Not making progress toward goals and plan of care will be adjusted     PLAN:  Patient will be discharged from physical therapy at this time.   Rationale for discharge:  [x] Goals Achieved  [] [address]  [] Patient not participating in therapy  [] Other:  Discharge Recommendations:  Home Health  Further Equipment Recommendations for Discharge:  N/A     SUBJECTIVE:   Patient stated I just was here 3 months ago.     OBJECTIVE DATA SUMMARY:   Critical Behavior:  Neurologic State: Alert, Appropriate for age  Orientation Level: Oriented X4  Cognition: Appropriate for age attention/concentration, Appropriate decision making, Appropriate safety awareness, Follows commands  Safety/Judgement: Awareness of environment, Good awareness of safety precautions, Home safety, Insight into deficits  Functional Mobility Training:  Bed Mobility:  Rolling: Supervision  Supine to Sit: Supervision  Sit to Supine: Supervision  Transfers:  Sit to Stand: Supervision(vc)  Stand to Sit: Supervision(vc)  Balance:  Sitting: Intact  Standing: Intact; With support  Ambulation/Gait Training:  Distance (ft): 160 Feet (ft)  Assistive Device: Walker, rolling;Gait belt;Brace/Splint  Ambulation - Level of Assistance: Supervision  Gait Abnormalities: Decreased step clearance; Antalgic  Base of Support: Narrowed  Speed/Yuliya: Slow  Interventions: Safety awareness training; Tactile cues; Verbal cues; Visual/Demos  Stairs:  Number of Stairs Trained: 5  Stairs - Level of Assistance: Contact guard assistance(vc)   Rail Use: Both  Neuro Re-Education:  Therapeutic Exercises:   Pain:  Pain Scale 1: Numeric (0 - 10)  Pain Intensity 1: 6  Pain Location 1: Back  Pain Orientation 1: Posterior; Lower  Pain Description 1: Aching  Pain Intervention(s) 1: Medication (see MAR)  Activity Tolerance:   Fair +  Please refer to the flowsheet for vital signs taken during this treatment.   After treatment:   [] Patient left in no apparent distress sitting up in chair  [x] Patient left in no apparent distress in bed  [x] Call bell left within reach  [x] Nursing notified  [] Caregiver present  [] Bed alarm activated  Nina Nieves PT   Time Calculation: 25 mins

## 2020-02-18 NOTE — PROGRESS NOTES
Transition of Care (ERNESTO) Plan:     Chart reviewed, met with pt in room. Pt planning discharge home, states he has assistance if needed. Pt states he plans to begin outpt PT next week, no HH needed per pt, none ordered per chart review. Pt has RW at home. No other needs, CM remains available. ERNESTO Transportation:   How is patient being transported at discharge? Family/Friend      When? Once cleared by Therapy between 12-2pm     Is transport scheduled? N/A      Follow-up appointment and transportation:   PCP/Specialist?  See AVS for Appointment         Who is transporting to the follow-up appointment? Family/Friend      Is transport for follow up appointment scheduled? N/A    Communication plan (with patient/family): Who is being called? Patient or Next of Kin? Responsible party? Patient      What number(s) is to be used? See Facesheet      What service provider is calling for [facility] services? When are they calling? 24-48 hours following discharge    Readmission Risk? (Green/Low; Yellow/Moderate; Red/High):  Green    Care Management Interventions  PCP Verified by CM:  Yes  Transition of Care Consult (CM Consult): Discharge Planning  Physical Therapy Consult: Yes  Occupational Therapy Consult: Yes  Current Support Network: Lives with Spouse  Confirm Follow Up Transport: Family  Discharge Location  Discharge Placement: Home with family assistance

## 2020-02-18 NOTE — PROGRESS NOTES
1925 - Bedside report received from  Tru Chun. Patient in bed at this time. Pain 8/10. Plan of care for the day addressed with the patient. White board updated. Pt informed to utilize call bell or this RN's zone phone for any questions, concerns, or needs. Pt verbalized understanding. Call bell, phone, personal items, and urinal w/in pt reach. Will continue to monitor. 1941 Scheduled Anca 5 mg administered for pain rated 8/10. Pt informed to call this RN if he feels the pain has not reduced w/in 30 minutes. Will continue to monitor. 2000  - Patient in bed at this time. A/O x 4. IV to L arm  intact and patent. TEDs and SCDs to BLE. In ON position. 4x4, ioban dressing to Lumbar Region CDI. Pt denies numbness/tingling. Pulses positive, palpable. Lungs clear. Bowel sounds active to all quadrants. Patient able to get to 1500 on the incentive spirometer. Pain 8/10. Will continue to monitor. 2119 PRN Morphine 2 mg administered for back pain rated 8-9/10. Pt refused Xanax at this time so the Morphine could be administered. Xanax may be re timed and administered later this evening. Xanax returned to [facility] w/ Sam Saldana as witness. 2120 PRN Tylenol 650 administered for pain. 2150 Pt lying on side, resting w/ eyes shut. No s/s of distress noted. 0200 Pt complaining of acid reflux. Pt provided milk to drink. Will reassess. 0300 Pt stated \"the milk did the trick\". Will continue to monitor. Bedside and Verbal shift change report given to Mallory Damon RN by Dolly Harris RN. Report included the following information SBAR, Kardex, OR Summary, Intake/Output and MAR.

## 2020-02-18 NOTE — DISCHARGE INSTRUCTIONS
Patient Discharge Instructions    Sabra Sandoval / [id number] : 1965    Admitted 2020 Discharged: 2020     · It is important that you take the medication exactly as they are prescribed. · Keep your medication in the bottles provided by the pharmacist and keep a list of the medication names, dosages, and times to be taken with you at all times. · Do not take other medications without consulting your doctor. What to do at Home    Discharge Instruction - SPINE      Weight Bearing Status:  Full weight bearing  DVT prophylaxis:  DO NOT take blood thinners until cleared by surgeon  Pain:  Continue analgesics as directed  Showering Instructions:  Do not shower until 3 days after surgery. After first shower remove the top dressing (yellow) and leave the under dressing (purple glue with mesh)  Dressing Instructions:  Keep surgical incision clean and dry at all times. No soaking or scrubbing of wound at any time. Driving Instructions:  No driving until cleared by Physician. PT/OT:  Begin PT immediately upon discharge as scheduled preoperatively  Appt Instructions: Follow-up with surgeon as scheduled preoperatively. If you are unsure of the date or time of your next visit please call office. Contact the office sooner if you experience any increased numbness/tingling in the extremities. Miscellaneous:  No Bending at waste  No lifting greater than 5 lbs. No Twisting at waste  No strenuous exercise. Wear Back brace at all times out of bed    Recommended Diet: Regular Diet    Recommended Activity: Activity as tolerated and No heavy lifting, pushing, pulling with the implant side for 2 months    If you experience any of the following symptoms: excessive drainage, temp greater than 101 F, shortness of breath, or chest pain please follow up with Dr. Amaryllis Scheuermann.     Signed By: Shanti Martínez MD     2020

## 2020-02-18 NOTE — PROGRESS NOTES
OCCUPATIONAL THERAPY EVALUATION/DISCHARGE    Patient: Jamie Bell (69 y.o. male)  Date: 2/18/2020  Primary Diagnosis: Spinal fracture of T12 vertebra ([facility] [address] [S22.089A]  Spinal fracture of T12 vertebra ([facility] [address] [S22.089A]  Procedure(s) (LRB):  REVISION THORACIC 10 - LUMBAR 2 POSTERIOR FUSION W/NEUROMONITORING AND C-ARM, \"SPEC POP\" (N/A) 1 Day Post-Op   Precautions: Fall, Back, Spinal  PLOF: pt was grossly mod I for ADls    ASSESSMENT AND RECOMMENDATIONS:  Based on the objective data described below, the patient presents with BLE decreased ROM and strength affecting LE ADLs. Patient able to doff socks with legs partially crossed method, but unable to don independently; spouse will be able to assist until pt regains ROM/flexibility. Pt able to don/doff brace independently. Pt completed bathroom mobility/toilet transfer with SBA. Pt has shower chair, grab bars, long handled sponge, reacher at home to improve independence with ADLs/maintain back precautions. Patient reports spouse will be available to assist with ADLs and LSO management. Pt left supine in bed, phone/call bell within reach. Education: Reviewed Back Precautions, LSO/Brace management, body mechanics, home safety, adaptive strategies and adaptive dressing techniques including clothing modifications with patient  verbalizing understanding at this time. Skilled Occupational Therapy is not indicated at this time in this setting. Patient should continue to improve as pain and ROM/strength improves. Discharge Recommendations: Home Health  Further Equipment Recommendations for Discharge: N/A      SUBJECTIVE:   Patient stated I think I'll be able to manage.  (with ADLs)    OBJECTIVE DATA SUMMARY:     Past Medical History:   Diagnosis Date    Arthritis     Back    Valencia's esophagus     Chronic pain     intermittent lumbar x 5 years    Diverticulitis     GERD (gastroesophageal reflux disease)     Hypertension     Ill-defined condition     gout Ill-defined condition     high cholesterol    Ill-defined condition     anxiety    Psychiatric disorder     anxiety    Stroke [facility]) 10/2014     Past Surgical History:   Procedure Laterality Date    HX GI      colon resection, diverticulitis    HX HERNIA REPAIR      Incisional    HX HERNIA REPAIR  1968    Ingunial    HX OTHER SURGICAL      Barretts esopagus- caudirized     NEUROLOGICAL PROCEDURE UNLISTED  2019    Posterior fusion     Barriers to Learning/Limitations: yes;  physical  Compensate with: visual, verbal, tactile, kinesthetic cues/model    Home Situation/Prior Level of Function: pt was grossly mod I for ADLs PTA  Home Situation  Home Environment: Private residence  # Steps to Enter: 4  Rails to Enter: Yes  Hand Rails : Bilateral  One/Two Story Residence: One story  Living Alone: No  Support Systems: Spouse/Significant Other/Partner  Patient Expects to be Discharged to[de-identified] Private residence  Current DME Used/Available at Home: Walker, rolling, Brace/Splint, Grab bars, Shower chair(grabber, long handled sponge)  Tub or Shower Type: Tub/Shower combination  []  Right hand dominant   []  Left hand dominant    Cognitive/Behavioral Status:  Neurologic State: Alert  Orientation Level: Oriented X4  Cognition: Appropriate decision making; Appropriate for age attention/concentration; Appropriate safety awareness; Follows commands  Safety/Judgement: Awareness of environment;Good awareness of safety precautions; Home safety; Insight into deficits    Skin: back incision w/ dressing    Coordination: BUE  Coordination: Within functional limits  Fine Motor Skills-Upper: Left Intact; Right Intact    Gross Motor Skills-Upper: Left Intact; Right Intact    Balance:  Sitting: Intact  Standing: Intact; With support    Strength: BUE  Strength: Generally decreased, functional    Tone & Sensation:BUE  Tone: Normal  Sensation: Intact     Range of Motion: BUE  AROM: Generally decreased, functional    Functional Mobility and Transfers for ADLs:  Bed Mobility:  Rolling: Stand-by assistance  Supine to Sit: Stand-by assistance  Sit to Supine: Stand-by assistance     Transfers:  Sit to Stand: Stand-by assistance   Toilet Transfer : Stand-by assistance   Bathroom Mobility: Stand-by assistance    ADL Assessment:  Feeding: Setup    Oral Facial Hygiene/Grooming: Setup    Bathing: Contact guard assistance    Upper Body Dressing: Stand-by assistance    Lower Body Dressing: Minimum assistance    Toileting: Stand by assistance     ADL Intervention:  Lower Body Dressing Assistance  Dressing Assistance: Minimum assistance  Socks: Minimum assistance  Position Performed: Seated edge of bed    LE Adaptive Equipment:  Was not issued - pt owns reacher/long handled sponge and was using previously. Pt requires assist with socks however reports spouse can assist until flexibility/ROM returns. Cognitive Retraining  Orientation Retraining: Awareness of environment  Problem Solving: Awareness of environment;Deductive reason;General alternative solution; Identifying the problem  Executive Functions: Executing cognitive plans  Organizing/Sequencing: Breaking task down;Prioritizing; Two step sequence  Attention to Task: Single task  Maintains Attention For (Time): 1 minute  Following Commands: Follows one step commands/directions  Safety/Judgement: Awareness of environment;Good awareness of safety precautions; Home safety; Insight into deficits    Pain:  Pain level pre-treatment: 8/10  Pain level post-treatment: 8/10  Pain Intervention(s): Medication administer by Nursing (see MAR); Rest, Ice, Repositioning   Response to intervention: Nurse notified, see doc flow sheet    Activity Tolerance:   Fair. Patient able to stand ~3 minute(s). Patient able to complete ADLs with intermittent rest breaks. Patient limited by pain, ROM, strength. Patient unsteady. Please refer to the flowsheet for vital signs taken during this treatment.   After treatment:   []  Patient left in no apparent distress sitting up in chair  []  Patient sitting on EOB  [x]  Patient left in no apparent distress in bed  [x]  Call bell left within reach  [x]  Nursing notified  []  Caregiver present  []  Ice applied  []  SCD's on while back in bed  [] Bed alarm activated    COMMUNICATION/EDUCATION:   Communication/Collaboration:  [x]       Role of Occupational Therapy in the acute care setting. [x]      Home safety education was provided and the patient/caregiver indicated understanding. [x]      Patient/family have participated as able in goal setting and plan of care. [x]      Patient/family agree to work toward stated goals and plan of care. []      Patient understands intent and goals of therapy, but is neutral about his/her participation. []      Patient is unable to participate in plan of care at this time. Thank you for this referral.  Tod Suarez OTR/L  Time Calculation: 13 mins    Eval Complexity: History: MEDIUM Complexity : Expanded review of history including physical, cognitive and psychosocial  history ; Examination: LOW Complexity : 1-3 performance deficits relating to physical, cognitive , or psychosocial skils that result in activity limitations and / or participation restrictions ;    Decision Making:LOW Complexity : No comorbidities that affect functional and no verbal or physical assistance needed to complete eval tasks

## 2020-02-19 NOTE — ROUTINE PROCESS
Dual AVS reviewed with ADRIANA Ledesma. All medications reviewed individually with patient. Opportunities for questions and concerns provided. Patient verbalized understanding and verified by teachback. IV discontinued, no redness, swelling or pain noted. Patient discharged via (mode of transport ie. Car, ambulance or air transport) car. Patient's arm band appropriately discarded.

## 2020-12-10 NOTE — ROUTINE PROCESS
Bedside and Verbal shift change report given to TATI Corral RN SBAR, Kardex, Intake/Output, MAR and Recent Results. Dyspnea

## 2021-03-24 ENCOUNTER — HOSPITAL ENCOUNTER (OUTPATIENT)
Dept: LAB | Age: 56
Discharge: HOME | End: 2021-03-24
Payer: COMMERCIAL

## 2021-03-24 LAB
HCT VFR BLD AUTO: 41.7 % (ref 36–48)
HGB BLD-MCNC: 13.8 G/DL (ref 13–16)
POTASSIUM SERPL-SCNC: 4.1 MMOL/L (ref 3.5–5.5)

## 2021-03-24 PROCEDURE — 85018 HEMOGLOBIN: CPT

## 2021-03-24 PROCEDURE — 36415 COLL VENOUS BLD VENIPUNCTURE: CPT

## 2021-03-24 PROCEDURE — 84132 ASSAY OF SERUM POTASSIUM: CPT

## 2021-03-29 ENCOUNTER — HOSPITAL ENCOUNTER (OUTPATIENT)
Dept: NON INVASIVE DIAGNOSTICS | Age: 56
Discharge: HOME | End: 2021-03-29
Payer: COMMERCIAL

## 2021-03-29 ENCOUNTER — TRANSCRIBE ORDER (OUTPATIENT)
Dept: REGISTRATION | Age: 56
End: 2021-03-29

## 2021-03-29 DIAGNOSIS — G56.03 CARPAL TUNNEL SYNDROME, BILATERAL: ICD-10-CM

## 2021-03-29 DIAGNOSIS — G56.03 CARPAL TUNNEL SYNDROME, BILATERAL: Primary | ICD-10-CM

## 2021-03-29 LAB
ATRIAL RATE: 60 BPM
CALCULATED P AXIS, ECG09: 41 DEGREES
CALCULATED R AXIS, ECG10: 40 DEGREES
CALCULATED T AXIS, ECG11: 46 DEGREES
DIAGNOSIS, 93000: NORMAL
P-R INTERVAL, ECG05: 142 MS
Q-T INTERVAL, ECG07: 382 MS
QRS DURATION, ECG06: 92 MS
QTC CALCULATION (BEZET), ECG08: 382 MS
VENTRICULAR RATE, ECG03: 60 BPM

## 2021-03-29 PROCEDURE — 93005 ELECTROCARDIOGRAM TRACING: CPT

## (undated) DEVICE — JACKSON TABLE POSITIONER KIT: Brand: MEDLINE INDUSTRIES, INC.

## (undated) DEVICE — TABLE COVER: Brand: CONVERTORS

## (undated) DEVICE — ROCKER SWITCH PENCIL HOLSTER: Brand: VALLEYLAB

## (undated) DEVICE — SYR LR LCK 1ML GRAD NSAF 30ML --

## (undated) DEVICE — INSERT CUSH HDRST PRONE AD LG --

## (undated) DEVICE — SOL IRRIGATION INJ NACL 0.9% 500ML BTL

## (undated) DEVICE — TRAY PREP DRY W/ PREM GLV 2 APPL 6 SPNG 2 UNDPD 1 OVERWRAP

## (undated) DEVICE — PACK PROCEDURE SURG LAMINECTOMY SPINE CUST

## (undated) DEVICE — 5.0MM ROUND FLUTED SOFT TOUCH

## (undated) DEVICE — SOLUTION IV 250ML 0.9% SOD CHL CLR INJ FLX BG CONT PRT CLSR

## (undated) DEVICE — SINGLE PORT MANIFOLD: Brand: NEPTUNE 2

## (undated) DEVICE — SPONGE HEMSTAT SURGCEL 2X4IN -- SURGIFOAM

## (undated) DEVICE — BOWL ASSY BM210 DUAL BLADE DISPOSABLE: Brand: MIDAS REX™

## (undated) DEVICE — DRAPE,SPLIT ,77X120: Brand: MEDLINE

## (undated) DEVICE — INTUBATING STYLET: Brand: SHILEY

## (undated) DEVICE — ROUND DISSECTORS: Brand: DEROYAL

## (undated) DEVICE — CLEANSER SKIN 32OZ 4% CHG ANTIMIC ANTISEP SGL WRP HIBICLN

## (undated) DEVICE — GOWN,SIRUS,NONRNF,SETINSLV,2XL,18/CS: Brand: MEDLINE

## (undated) DEVICE — BIPOLAR SEALER 23-312-1 AQM SBS 5.0: Brand: AQUAMANTYS™

## (undated) DEVICE — 3M™ IOBAN™ 2 ANTIMICROBIAL INCISE DRAPE 6650EZ: Brand: IOBAN™ 2

## (undated) DEVICE — SPONGE DRAIN NONWOVEN 4X4IN -- 2/PK

## (undated) DEVICE — SUT ETHLN 2-0 18IN FS BLK --

## (undated) DEVICE — NDL SPNE QNCKE 18GX3.5IN LF --

## (undated) DEVICE — DRAPE C-ARMOUR C-ARM KIT --

## (undated) DEVICE — SURGIFOAM SPNG SZ 100

## (undated) DEVICE — 4.0MM FINE DIAMOND

## (undated) DEVICE — SUT VCRL + 2-0 36IN CT1 UD --

## (undated) DEVICE — SYSTEM SKIN CLSR 22CM DERMBND PRINEO

## (undated) DEVICE — SYR 10ML LUER LOK 1/5ML GRAD --

## (undated) DEVICE — Device

## (undated) DEVICE — FLOSEAL HEMOSTATIC MATRIX, 10 ML: Brand: FLOSEAL

## (undated) DEVICE — SUT VCRL + 1 36IN CT1 VIO --

## (undated) DEVICE — VESSEL LOOPS,MAXI, RED: Brand: DEVON

## (undated) DEVICE — 5.0MM X-COARSE DIAMOND

## (undated) DEVICE — VIPER SYSTEM GUIDEWIRE, BLUNT AND DISPOSABLE 1.45MM: Brand: VIPER

## (undated) DEVICE — X-RAY SPONGES,12 PLY: Brand: DERMACEA

## (undated) DEVICE — Z DISCONTINUED USE (MFG CAT 7984-37) SOLUTION IV SODIUM CHL 0.9% 100 ML INJ

## (undated) DEVICE — Z DISCONTINUED USE 2219801 STAPLER SKIN REG CRWN L5.7MM LEG L3.9MM WIRE DIA0.53MM PROX

## (undated) DEVICE — FLOSEAL MATRIX IS INDICATED IN SURGICAL PROCEDURES (OTHER THAN IN OPHTHALMIC) AS AN ADJUNCT TO HEMOSTASIS WHEN CONTROL OF BLEEDING BY LIGATURE OR CONVENTIONALPROCEDURES IS INEFFECTIVE OR IMPRACTICAL.: Brand: FLOSEAL HEMOSTATIC MATRIX

## (undated) DEVICE — MEDI-VAC SUCTION HANDLE REGULAR CAPACITY: Brand: CARDINAL HEALTH

## (undated) DEVICE — DISPOSABLE HARDY BAYONET INSULATED BIPOLAR FORCEPS: Brand: INTEGRA® JARIT®

## (undated) DEVICE — MEDI-VAC NON-CONDUCTIVE SUCTION TUBING: Brand: CARDINAL HEALTH

## (undated) DEVICE — GARMENT,MEDLINE,DVT,INT,CALF,MED, GEN2: Brand: MEDLINE

## (undated) DEVICE — APPLIER LIG CLP M L11IN TI STR RNG HNDL FOR 20 CLP DISP

## (undated) DEVICE — REM POLYHESIVE ADULT PATIENT RETURN ELECTRODE: Brand: VALLEYLAB

## (undated) DEVICE — BASIC SINGLE BASIN 1-LF: Brand: MEDLINE INDUSTRIES, INC.

## (undated) DEVICE — SPONGE LAP 18X18IN STRL -- 5/PK

## (undated) DEVICE — STERILE POLYISOPRENE POWDER-FREE SURGICAL GLOVES: Brand: PROTEXIS

## (undated) DEVICE — NEEDLE HYPO 21GA L1.5IN INTRAMUSCULAR S STL LATCH BVL UP

## (undated) DEVICE — 3M™ STERI-DRAPE™ CESAREAN-SECTION SHEET AND POUCH WITH IOBAN™ 2 INCISE FILM 6697: Brand: STERI-DRAPE™ IOBAN™ 2

## (undated) DEVICE — SUT PROL 5-0 36IN RB1 DA BLU --

## (undated) DEVICE — 5.0MM ROUND FLUTED

## (undated) DEVICE — 2.0MM FINE DIAMOND

## (undated) DEVICE — SPONGE GZ W4XL4IN COT 12 PLY TYP VII WVN C FLD DSGN

## (undated) DEVICE — APPLICATOR SEAL FLOSEAL 5MM+ --

## (undated) DEVICE — SYRINGE IRRIG 60ML SFT PLIABLE BLB EZ TO GRP 1 HND USE W/

## (undated) DEVICE — HEX-LOCKING BLADE ELECTRODE: Brand: EDGE

## (undated) DEVICE — SYNFIX EVO PROTECTION SLEEVE

## (undated) DEVICE — SHEET,DRAPE,70X85,STERILE: Brand: MEDLINE

## (undated) DEVICE — TOTAL TRAY, DB, 100% SILI FOLEY, 16FR 10: Brand: MEDLINE

## (undated) DEVICE — SHEET,DRAPE,40X58,STERILE: Brand: MEDLINE

## (undated) DEVICE — STERILE POLYISOPRENE POWDER-FREE SURGICAL GLOVES WITH EMOLLIENT COATING: Brand: PROTEXIS

## (undated) DEVICE — PREP SKN CHLRAPRP 26ML TNT -- CONVERT TO ITEM 373320

## (undated) DEVICE — CATH IV AUTOGRD ORN 14GA 45MM -- INSYTE-N

## (undated) DEVICE — BLADE ELECTRODE: Brand: EDGE

## (undated) DEVICE — CURVED, SMALL JAW, OPEN SEALER/DIVIDER: Brand: LIGASURE

## (undated) DEVICE — 1010 S-DRAPE TOWEL DRAPE 10/BX: Brand: STERI-DRAPE™

## (undated) DEVICE — 4.0MM ROUND FLUTED SOFT TOUCH

## (undated) DEVICE — KIT EVAC 0.13IN RECT TB DIA10FR 400CC PVC 3 SPR Y CONN DRN

## (undated) DEVICE — BIPOLAR SEALER 23-301-1 AQM MBS: Brand: AQUAMANTYS™

## (undated) DEVICE — (D)PREP SKN CHLRAPRP APPL 26ML -- CONVERT TO ITEM 371833

## (undated) DEVICE — SUTURE VCRL + SZ 1 L18IN ABSRB UD L36MM CT-1 1/2 CIR VCP841D

## (undated) DEVICE — SYR 5ML 1/5 GRAD LL NSAF LF --